# Patient Record
Sex: MALE | Race: WHITE | Employment: OTHER | URBAN - METROPOLITAN AREA
[De-identification: names, ages, dates, MRNs, and addresses within clinical notes are randomized per-mention and may not be internally consistent; named-entity substitution may affect disease eponyms.]

---

## 2017-01-05 ENCOUNTER — OFFICE VISIT (OUTPATIENT)
Dept: FAMILY MEDICINE CLINIC | Age: 57
End: 2017-01-05

## 2017-01-05 VITALS
OXYGEN SATURATION: 97 % | DIASTOLIC BLOOD PRESSURE: 76 MMHG | WEIGHT: 187 LBS | SYSTOLIC BLOOD PRESSURE: 128 MMHG | HEART RATE: 70 BPM | HEIGHT: 69 IN | BODY MASS INDEX: 27.7 KG/M2 | RESPIRATION RATE: 16 BRPM

## 2017-01-05 DIAGNOSIS — D22.9 CHANGE IN MOLE: ICD-10-CM

## 2017-01-05 DIAGNOSIS — I10 ESSENTIAL HYPERTENSION: Primary | ICD-10-CM

## 2017-01-05 DIAGNOSIS — N52.9 ERECTILE DYSFUNCTION, UNSPECIFIED ERECTILE DYSFUNCTION TYPE: ICD-10-CM

## 2017-01-05 DIAGNOSIS — I10 ESSENTIAL HYPERTENSION: ICD-10-CM

## 2017-01-05 DIAGNOSIS — F17.200 TOBACCO DEPENDENCY: ICD-10-CM

## 2017-01-05 DIAGNOSIS — J06.9 UPPER RESPIRATORY TRACT INFECTION, UNSPECIFIED TYPE: ICD-10-CM

## 2017-01-05 DIAGNOSIS — M72.2 PLANTAR FASCIITIS OF LEFT FOOT: ICD-10-CM

## 2017-01-05 LAB
A/G RATIO: 1.7 (ref 1.1–2.2)
ALBUMIN SERPL-MCNC: 4.2 G/DL (ref 3.4–5)
ALP BLD-CCNC: 98 U/L (ref 40–129)
ALT SERPL-CCNC: 18 U/L (ref 10–40)
ANION GAP SERPL CALCULATED.3IONS-SCNC: 15 MMOL/L (ref 3–16)
AST SERPL-CCNC: 20 U/L (ref 15–37)
BILIRUB SERPL-MCNC: <0.2 MG/DL (ref 0–1)
BUN BLDV-MCNC: 11 MG/DL (ref 7–20)
CALCIUM SERPL-MCNC: 9.2 MG/DL (ref 8.3–10.6)
CHLORIDE BLD-SCNC: 101 MMOL/L (ref 99–110)
CHOLESTEROL, TOTAL: 199 MG/DL (ref 0–199)
CO2: 24 MMOL/L (ref 21–32)
CREAT SERPL-MCNC: 0.9 MG/DL (ref 0.9–1.3)
GFR AFRICAN AMERICAN: >60
GFR NON-AFRICAN AMERICAN: >60
GLOBULIN: 2.5 G/DL
GLUCOSE BLD-MCNC: 96 MG/DL (ref 70–99)
HDLC SERPL-MCNC: 29 MG/DL (ref 40–60)
LDL CHOLESTEROL CALCULATED: 130 MG/DL
POTASSIUM SERPL-SCNC: 4.8 MMOL/L (ref 3.5–5.1)
SODIUM BLD-SCNC: 140 MMOL/L (ref 136–145)
TOTAL PROTEIN: 6.7 G/DL (ref 6.4–8.2)
TRIGL SERPL-MCNC: 199 MG/DL (ref 0–150)
VLDLC SERPL CALC-MCNC: 40 MG/DL

## 2017-01-05 PROCEDURE — 99214 OFFICE O/P EST MOD 30 MIN: CPT | Performed by: FAMILY MEDICINE

## 2017-01-05 RX ORDER — AZITHROMYCIN 250 MG/1
TABLET, FILM COATED ORAL
Qty: 1 PACKET | Refills: 0 | Status: SHIPPED | OUTPATIENT
Start: 2017-01-05 | End: 2017-01-15

## 2017-01-05 RX ORDER — SILDENAFIL 100 MG/1
100 TABLET, FILM COATED ORAL PRN
Qty: 15 TABLET | Refills: 3 | Status: SHIPPED | OUTPATIENT
Start: 2017-01-05 | End: 2018-04-05

## 2017-01-05 RX ORDER — METHYLPREDNISOLONE 4 MG/1
TABLET ORAL
Qty: 21 TABLET | Refills: 0 | Status: SHIPPED | OUTPATIENT
Start: 2017-01-05 | End: 2017-08-17 | Stop reason: ALTCHOICE

## 2017-01-05 ASSESSMENT — ENCOUNTER SYMPTOMS
SHORTNESS OF BREATH: 0
BLURRED VISION: 0

## 2017-01-09 ENCOUNTER — TELEPHONE (OUTPATIENT)
Dept: FAMILY MEDICINE CLINIC | Age: 57
End: 2017-01-09

## 2017-01-09 RX ORDER — AMLODIPINE BESYLATE 5 MG/1
5 TABLET ORAL DAILY
Qty: 30 TABLET | Refills: 3 | Status: SHIPPED | OUTPATIENT
Start: 2017-01-09 | End: 2018-03-11 | Stop reason: ALTCHOICE

## 2017-01-09 RX ORDER — ATORVASTATIN CALCIUM 10 MG/1
10 TABLET, FILM COATED ORAL DAILY
Qty: 30 TABLET | Refills: 3 | Status: ON HOLD | OUTPATIENT
Start: 2017-01-09 | End: 2018-03-13 | Stop reason: HOSPADM

## 2017-08-17 ENCOUNTER — OFFICE VISIT (OUTPATIENT)
Dept: ORTHOPEDIC SURGERY | Age: 57
End: 2017-08-17

## 2017-08-17 VITALS
HEIGHT: 70 IN | WEIGHT: 185 LBS | BODY MASS INDEX: 26.48 KG/M2 | DIASTOLIC BLOOD PRESSURE: 85 MMHG | SYSTOLIC BLOOD PRESSURE: 116 MMHG | HEART RATE: 72 BPM

## 2017-08-17 DIAGNOSIS — M65.311 TRIGGER FINGER OF RIGHT THUMB: ICD-10-CM

## 2017-08-17 PROCEDURE — 20550 NJX 1 TENDON SHEATH/LIGAMENT: CPT | Performed by: ORTHOPAEDIC SURGERY

## 2017-08-17 PROCEDURE — 99243 OFF/OP CNSLTJ NEW/EST LOW 30: CPT | Performed by: ORTHOPAEDIC SURGERY

## 2018-03-11 PROBLEM — R07.9 CHEST PAIN: Status: ACTIVE | Noted: 2018-03-11

## 2018-03-13 ENCOUNTER — TELEPHONE (OUTPATIENT)
Dept: CARDIOLOGY | Age: 58
End: 2018-03-13

## 2018-03-19 ENCOUNTER — TELEPHONE (OUTPATIENT)
Dept: CARDIOLOGY CLINIC | Age: 58
End: 2018-03-19

## 2018-03-19 NOTE — TELEPHONE ENCOUNTER
Pt's wife  called stating her  is feeling better. States numbness is gone, color has returned and wrist feels normal temp.

## 2018-03-26 ENCOUNTER — HOSPITAL ENCOUNTER (OUTPATIENT)
Dept: OTHER | Age: 58
Discharge: HOME | End: 2018-03-31
Attending: INTERNAL MEDICINE | Admitting: INTERNAL MEDICINE

## 2018-03-29 ENCOUNTER — OFFICE VISIT (OUTPATIENT)
Dept: CARDIOLOGY CLINIC | Age: 58
End: 2018-03-29

## 2018-03-29 ENCOUNTER — HOSPITAL ENCOUNTER (OUTPATIENT)
Dept: OTHER | Age: 58
Discharge: HOME | End: 2018-03-31
Attending: NURSE PRACTITIONER | Admitting: NURSE PRACTITIONER

## 2018-03-29 VITALS
OXYGEN SATURATION: 96 % | HEART RATE: 60 BPM | HEIGHT: 70 IN | SYSTOLIC BLOOD PRESSURE: 114 MMHG | DIASTOLIC BLOOD PRESSURE: 76 MMHG | BODY MASS INDEX: 28.06 KG/M2 | WEIGHT: 196 LBS

## 2018-03-29 DIAGNOSIS — I10 ESSENTIAL HYPERTENSION: ICD-10-CM

## 2018-03-29 DIAGNOSIS — I25.10 CORONARY ARTERY DISEASE INVOLVING NATIVE CORONARY ARTERY OF NATIVE HEART WITHOUT ANGINA PECTORIS: ICD-10-CM

## 2018-03-29 DIAGNOSIS — I25.5 ISCHEMIC CARDIOMYOPATHY: Primary | ICD-10-CM

## 2018-03-29 LAB
ANION GAP SERPL CALCULATED.3IONS-SCNC: 13 MMOL/L (ref 3–16)
BUN BLDV-MCNC: 14 MG/DL (ref 7–20)
CALCIUM SERPL-MCNC: 9.7 MG/DL (ref 8.3–10.6)
CHLORIDE BLD-SCNC: 98 MMOL/L (ref 99–110)
CO2: 27 MMOL/L (ref 21–32)
CREAT SERPL-MCNC: 0.8 MG/DL (ref 0.9–1.3)
GFR AFRICAN AMERICAN: >60
GFR NON-AFRICAN AMERICAN: >60
GLUCOSE BLD-MCNC: 84 MG/DL (ref 70–99)
POTASSIUM SERPL-SCNC: 5.2 MMOL/L (ref 3.5–5.1)
SODIUM BLD-SCNC: 138 MMOL/L (ref 136–145)

## 2018-03-29 PROCEDURE — 99213 OFFICE O/P EST LOW 20 MIN: CPT | Performed by: NURSE PRACTITIONER

## 2018-03-29 RX ORDER — ASPIRIN 81 MG/1
81 TABLET, CHEWABLE ORAL DAILY
Qty: 90 TABLET | Refills: 2 | Status: SHIPPED | OUTPATIENT
Start: 2018-03-29 | End: 2019-06-25 | Stop reason: SDUPTHER

## 2018-03-29 RX ORDER — METOPROLOL SUCCINATE 25 MG/1
25 TABLET, EXTENDED RELEASE ORAL DAILY
Qty: 90 TABLET | Refills: 2 | Status: SHIPPED | OUTPATIENT
Start: 2018-03-29 | End: 2018-06-27 | Stop reason: SDUPTHER

## 2018-03-29 RX ORDER — ATORVASTATIN CALCIUM 40 MG/1
40 TABLET, FILM COATED ORAL NIGHTLY
Qty: 90 TABLET | Refills: 2 | Status: SHIPPED | OUTPATIENT
Start: 2018-03-29 | End: 2018-12-21 | Stop reason: SDUPTHER

## 2018-03-29 NOTE — LETTER
[facility] Cardiology - [address] [facility] [place] [address]  Phone: [phone]  Fax: [phone]    Jm Macario CNP        April 2, 2018     Abhi Olvera [address] [facility]    Patient: Mary Harris  MR Number: [id number]  YOB: 1960  Date of Visit: 3/29/2018    Dear Dr. Radhika Garcia: Thank you for the request for consultation for Zoe Leigh to me for the evaluation. Below are the relevant portions of my assessment and plan of care. [address]   Cardiac Follow-up    Primary Care Doctor:  Radhika Garcia MD    Chief Complaint   Patient presents with   [address] [place] [facility] [place]3/13/2018, wero [place] Held 3/12/2018    Hypertension    Hyperlipidemia    Discuss Labs     lipid 3/12/2018, troponin 3/13/2018, cmp 3/11/2018    Results     ekg 3/14/2018        History of Present Illness:   I had the pleasure of seeing Mary Harris in follow up for hospital follow up. Recently admitted from 3/11/18-3/13/18 with chest pain, underwent LHC on 3/12/18 PCI and POBA of the mid LAD. Since discharge, he is feeling good, breathing is so much better. No complaints. He has improved his diet completely, no red meat, more fruits and vegtables. No bleeding. Snoring has imrpoved. Right wrist symptoms improved, no more numbness or tingling or temperature changes to the right hand. He is eating better. Sleeping better. Mary Harris  denies chest pain, dyspnea, palpitations, orthopnea, PND, fatigue, early saiety, edema, syncope. NYHA:   I  ACC/ AHA Stage:    B    Past Medical History:   has a past medical history of CAD (coronary artery disease); Hyperlipidemia; and Hypertension. Surgical History:   has a past surgical history that includes Colonoscopy and Percutaneous Transluminal Coronary Angio (03/12/2018). Social History:   reports that he has quit smoking. He smoked 1.00 pack per day.  He has · Endocrine:  No excessive thirst or urination. · Hematologic/Lymphatic: No abnormal bruising or bleeding, blood clots or swollen lymph nodes.     Physical Examination:    Vitals:    03/29/18 1303   BP: 114/76   Pulse: 60   SpO2: 96%   Weight: 196 lb (88.9 kg)   Height: 5' 10\" (1.778 m)        Constitutional and General Appearance: no apparent distress  HEENT: non-icteric sclera, oropharynx without exudate, oral mucosa moist  Neck: JVP less than 8 cm H20  Respiratory:  · No use of accessory muscles  · Clear breath sounds throughout, no wheezing, no crackles, no rhonchi  Cardiovascular:  · The apical impulses not displaced  · Heart tones are crisp and normal, no murmur/rub/gallop  · Regular rate and rhythm, S1,S2 normal  · Radial pulses 2+ and equal bilaterally  · No edema, right wrist no hematoma or bruising, 5/5 strength bilateral hand , warm  · Pedal Pulses: 2+ and equal   Abdomen:  · No masses or tenderness  · Liver: No Abnormalities Noted  Musculoskeletal/Skin:  · Exhibits normal gait balance and coordination  · There is no clubbing, cyanosis of the extremities  · Skin is warm and dry  · Moves all extremities well  Neurological/Psychiatric:  · Alert and oriented in all spheres  · No abnormalities of mood, affect, memory, mentation, or behavior are noted    Lab Data:  CBC:   Lab Results   Component Value Date    WBC 9.1 03/13/2018    WBC 8.0 03/11/2018    WBC 10.1 07/07/2016    RBC 4.77 03/13/2018    RBC 5.21 03/11/2018    RBC 5.03 07/07/2016    HGB 14.1 03/13/2018    HGB 15.5 03/11/2018    HGB 15.2 07/07/2016    HCT 41.9 03/13/2018    HCT 45.8 03/11/2018    HCT 45.8 07/07/2016    MCV 87.9 03/13/2018    MCV 88.0 03/11/2018    MCV 91.0 07/07/2016    RDW 13.5 03/13/2018    RDW 13.3 03/11/2018    RDW 14.2 07/07/2016     03/13/2018     03/11/2018     07/07/2016     BMP:   Lab Results   Component Value Date     03/11/2018     01/05/2017     07/07/2016    K 4.5 03/11/2018 lisinopril restart  2. No changes to medications  3. Follow up 3 months with Dr. Tania Styles  1. Tobacco Cessation Counseling: NA  2. Retake of BP if >140/90:   NA  3. Documentation to PCP/referring for new patient:  Sent to PCP at close of office visit  4. CAD patient on anti-platelet: Yes  5. CAD patient on STATIN therapy:  Yes  6. Patient with CHF and aFib on anticoagulation:  NA     I appreciate the opportunity for caring for this patient. Richmond Carrington CNP, 3/29/2018, 1:18 PM         If you have questions, please do not hesitate to call me. I look forward to following Roberto Vyas along with you.     Sincerely,        Richmond Carrington CNP

## 2018-03-29 NOTE — PATIENT INSTRUCTIONS
Plan:   1. Check labs -standing order for BMP  2. No changes to medications  3. Continue cardiac rehab  4.  Follow up 3 months with Dr. King How

## 2018-03-29 NOTE — PROGRESS NOTES
rhonchi  Cardiovascular:  · The apical impulses not displaced  · Heart tones are crisp and normal, no murmur/rub/gallop  · Regular rate and rhythm, S1,S2 normal  · Radial pulses 2+ and equal bilaterally  · No edema, right wrist no hematoma or bruising, 5/5 strength bilateral hand , warm  · Pedal Pulses: 2+ and equal   Abdomen:  · No masses or tenderness  · Liver: No Abnormalities Noted  Musculoskeletal/Skin:  · Exhibits normal gait balance and coordination  · There is no clubbing, cyanosis of the extremities  · Skin is warm and dry  · Moves all extremities well  Neurological/Psychiatric:  · Alert and oriented in all spheres  · No abnormalities of mood, affect, memory, mentation, or behavior are noted    Lab Data:  CBC:   Lab Results   Component Value Date    WBC 9.1 03/13/2018    WBC 8.0 03/11/2018    WBC 10.1 07/07/2016    RBC 4.77 03/13/2018    RBC 5.21 03/11/2018    RBC 5.03 07/07/2016    HGB 14.1 03/13/2018    HGB 15.5 03/11/2018    HGB 15.2 07/07/2016    HCT 41.9 03/13/2018    HCT 45.8 03/11/2018    HCT 45.8 07/07/2016    MCV 87.9 03/13/2018    MCV 88.0 03/11/2018    MCV 91.0 07/07/2016    RDW 13.5 03/13/2018    RDW 13.3 03/11/2018    RDW 14.2 07/07/2016     03/13/2018     03/11/2018     07/07/2016     BMP:   Lab Results   Component Value Date     03/11/2018     01/05/2017     07/07/2016    K 4.5 03/11/2018    K 4.8 01/05/2017    K 4.7 07/07/2016     03/11/2018     01/05/2017     07/07/2016    CO2 23 03/11/2018    CO2 24 01/05/2017    CO2 20 07/07/2016    BUN 12 03/11/2018    BUN 11 01/05/2017    BUN 19 07/07/2016    CREATININE 0.8 03/11/2018    CREATININE 0.9 01/05/2017    CREATININE 0.8 07/07/2016     BNP: No results found for: PROBNP    Recent Testing:  Echo 3/13/18  Summary   Left ventricular systolic function is mildly reduced with an ejection   fraction of 45-50 %. Hypokinetic apex and anteroapical wall.    Mild concentric left ventricular

## 2018-03-30 DIAGNOSIS — I25.5 ISCHEMIC CARDIOMYOPATHY: Primary | ICD-10-CM

## 2018-04-01 ENCOUNTER — HOSPITAL ENCOUNTER (OUTPATIENT)
Dept: OTHER | Age: 58
Discharge: HOME | End: 2018-04-30
Attending: NURSE PRACTITIONER | Admitting: NURSE PRACTITIONER

## 2018-04-01 ENCOUNTER — HOSPITAL ENCOUNTER (OUTPATIENT)
Dept: OTHER | Age: 58
Discharge: HOME | End: 2018-04-30
Attending: INTERNAL MEDICINE | Admitting: INTERNAL MEDICINE

## 2018-04-02 NOTE — COMMUNICATION BODY
[address]   Cardiac Follow-up    Primary Care Doctor:  Phu Villeda MD    Chief Complaint   Patient presents with   [address] [place] [facility] [place]     3/11-3/13/2018, wero [place]in 3/12/2018    Hypertension    Hyperlipidemia    Discuss Labs     lipid 3/12/2018, troponin 3/13/2018, cmp 3/11/2018    Results     ekg 3/14/2018        History of Present Illness:   I had the pleasure of seeing Anaid Murrell in follow up for hospital follow up. Recently admitted from 3/11/18-3/13/18 with chest pain, underwent LHC on 3/12/18 PCI and POBA of the mid LAD. Since discharge, he is feeling good, breathing is so much better. No complaints. He has improved his diet completely, no red meat, more fruits and vegtables. No bleeding. Snoring has imrpoved. Right wrist symptoms improved, no more numbness or tingling or temperature changes to the right hand. He is eating better. Sleeping better. Anaid Murrell  denies chest pain, dyspnea, palpitations, orthopnea, PND, fatigue, early saiety, edema, syncope. NYHA:   I  ACC/ AHA Stage:    B    Past Medical History:   has a past medical history of CAD (coronary artery disease); Hyperlipidemia; and Hypertension. Surgical History:   has a past surgical history that includes Colonoscopy and Percutaneous Transluminal Coronary Angio (03/12/2018). Social History:   reports that he has quit smoking. He smoked 1.00 pack per day. He has never used smokeless tobacco. He reports that he uses drugs, including Marijuana. He reports that he does not drink alcohol. Family History:   History reviewed. No pertinent family history. Home Medications:  Prior to Admission medications    Medication Sig Start Date End Date Taking?  Authorizing Provider   aspirin 81 MG chewable tablet Take 1 tablet by mouth daily 3/14/18  Yes Zain Lees CNP   atorvastatin (LIPITOR) 40 MG tablet Take 1 tablet by mouth nightly 3/13/18  Yes Zain Lees CNP   metoprolol succinate (TOPROL

## 2018-04-04 ENCOUNTER — TELEPHONE (OUTPATIENT)
Dept: CARDIOLOGY CLINIC | Age: 58
End: 2018-04-04

## 2018-04-05 ENCOUNTER — OFFICE VISIT (OUTPATIENT)
Dept: ORTHOPEDIC SURGERY | Age: 58
End: 2018-04-05

## 2018-04-05 VITALS — WEIGHT: 195.99 LBS | HEIGHT: 70 IN | BODY MASS INDEX: 28.06 KG/M2

## 2018-04-05 DIAGNOSIS — M79.645 FINGER PAIN, LEFT: ICD-10-CM

## 2018-04-05 DIAGNOSIS — M79.644 FINGER PAIN, RIGHT: Primary | ICD-10-CM

## 2018-04-05 DIAGNOSIS — M65.312 TRIGGER FINGER OF LEFT THUMB: ICD-10-CM

## 2018-04-05 PROCEDURE — 20550 NJX 1 TENDON SHEATH/LIGAMENT: CPT | Performed by: ORTHOPAEDIC SURGERY

## 2018-04-05 PROCEDURE — 99213 OFFICE O/P EST LOW 20 MIN: CPT | Performed by: ORTHOPAEDIC SURGERY

## 2018-04-11 PROBLEM — R07.89 OTHER CHEST PAIN: Status: ACTIVE | Noted: 2018-03-11

## 2018-04-12 ENCOUNTER — TELEPHONE (OUTPATIENT)
Dept: CARDIOLOGY CLINIC | Age: 58
End: 2018-04-12

## 2018-05-01 ENCOUNTER — HOSPITAL ENCOUNTER (OUTPATIENT)
Dept: OTHER | Age: 58
Discharge: HOME | End: 2018-05-31
Attending: INTERNAL MEDICINE | Admitting: INTERNAL MEDICINE

## 2018-06-01 ENCOUNTER — HOSPITAL ENCOUNTER (OUTPATIENT)
Dept: OTHER | Age: 58
Discharge: HOME | End: 2018-06-01
Attending: INTERNAL MEDICINE | Admitting: INTERNAL MEDICINE

## 2018-06-27 ENCOUNTER — HOSPITAL ENCOUNTER (OUTPATIENT)
Dept: GENERAL RADIOLOGY | Age: 58
Discharge: HOME | End: 2018-06-27
Attending: INTERNAL MEDICINE | Admitting: INTERNAL MEDICINE

## 2018-06-27 ENCOUNTER — OFFICE VISIT (OUTPATIENT)
Dept: CARDIOLOGY CLINIC | Age: 58
End: 2018-06-27

## 2018-06-27 VITALS
DIASTOLIC BLOOD PRESSURE: 66 MMHG | HEIGHT: 70 IN | BODY MASS INDEX: 28.92 KG/M2 | HEART RATE: 55 BPM | SYSTOLIC BLOOD PRESSURE: 118 MMHG | WEIGHT: 202 LBS | OXYGEN SATURATION: 98 %

## 2018-06-27 DIAGNOSIS — E87.5 HYPERKALEMIA: ICD-10-CM

## 2018-06-27 DIAGNOSIS — I21.4 NSTEMI (NON-ST ELEVATED MYOCARDIAL INFARCTION) (HCC): ICD-10-CM

## 2018-06-27 DIAGNOSIS — I25.5 ISCHEMIC CARDIOMYOPATHY: ICD-10-CM

## 2018-06-27 DIAGNOSIS — I25.10 CORONARY ARTERY DISEASE INVOLVING NATIVE CORONARY ARTERY OF NATIVE HEART WITHOUT ANGINA PECTORIS: Primary | ICD-10-CM

## 2018-06-27 DIAGNOSIS — I10 ESSENTIAL HYPERTENSION: ICD-10-CM

## 2018-06-27 DIAGNOSIS — E78.5 HYPERLIPIDEMIA, UNSPECIFIED HYPERLIPIDEMIA TYPE: ICD-10-CM

## 2018-06-27 LAB
ANION GAP SERPL CALCULATED.3IONS-SCNC: 11 MMOL/L (ref 3–16)
BUN BLDV-MCNC: 10 MG/DL (ref 7–20)
CALCIUM SERPL-MCNC: 9.6 MG/DL (ref 8.3–10.6)
CHLORIDE BLD-SCNC: 108 MMOL/L (ref 99–110)
CO2: 25 MMOL/L (ref 21–32)
CREAT SERPL-MCNC: 0.9 MG/DL (ref 0.9–1.3)
GFR AFRICAN AMERICAN: >60
GFR NON-AFRICAN AMERICAN: >60
GLUCOSE BLD-MCNC: 90 MG/DL (ref 70–99)
POTASSIUM SERPL-SCNC: 5.5 MMOL/L (ref 3.5–5.1)
SODIUM BLD-SCNC: 144 MMOL/L (ref 136–145)

## 2018-06-27 PROCEDURE — 99214 OFFICE O/P EST MOD 30 MIN: CPT | Performed by: INTERNAL MEDICINE

## 2018-06-27 RX ORDER — METOPROLOL SUCCINATE 25 MG/1
25 TABLET, EXTENDED RELEASE ORAL 2 TIMES DAILY
Qty: 90 TABLET | Refills: 2
Start: 2018-06-27 | End: 2019-06-25 | Stop reason: SDUPTHER

## 2018-06-27 RX ORDER — LOSARTAN POTASSIUM 50 MG/1
50 TABLET ORAL DAILY
Qty: 30 TABLET | Refills: 3
Start: 2018-06-27 | End: 2019-06-25 | Stop reason: SDUPTHER

## 2018-06-29 ENCOUNTER — TELEPHONE (OUTPATIENT)
Dept: CARDIOLOGY CLINIC | Age: 58
End: 2018-06-29

## 2018-07-24 ENCOUNTER — APPOINTMENT (OUTPATIENT)
Dept: GENERAL RADIOLOGY | Age: 58
End: 2018-07-24
Payer: COMMERCIAL

## 2018-07-24 ENCOUNTER — HOSPITAL ENCOUNTER (EMERGENCY)
Age: 58
Discharge: HOME | End: 2018-07-24
Attending: EMERGENCY MEDICINE
Payer: COMMERCIAL

## 2018-07-24 VITALS
SYSTOLIC BLOOD PRESSURE: 141 MMHG | BODY MASS INDEX: 28.12 KG/M2 | HEART RATE: 65 BPM | TEMPERATURE: 98.4 F | OXYGEN SATURATION: 99 % | WEIGHT: 196 LBS | DIASTOLIC BLOOD PRESSURE: 75 MMHG | RESPIRATION RATE: 13 BRPM

## 2018-07-24 DIAGNOSIS — R07.89 CHEST TIGHTNESS: ICD-10-CM

## 2018-07-24 DIAGNOSIS — I10 ESSENTIAL HYPERTENSION: Primary | ICD-10-CM

## 2018-07-24 LAB
A/G RATIO: 1.6 (ref 1.1–2.2)
ALBUMIN SERPL-MCNC: 4.4 G/DL (ref 3.4–5)
ALP BLD-CCNC: 83 U/L (ref 40–129)
ALT SERPL-CCNC: 32 U/L (ref 10–40)
ANION GAP SERPL CALCULATED.3IONS-SCNC: 13 MMOL/L (ref 3–16)
APTT: 29.6 SEC (ref 26–36)
AST SERPL-CCNC: 24 U/L (ref 15–37)
BASOPHILS ABSOLUTE: 0 K/UL (ref 0–0.2)
BASOPHILS RELATIVE PERCENT: 0.4 %
BILIRUB SERPL-MCNC: 0.4 MG/DL (ref 0–1)
BUN BLDV-MCNC: 13 MG/DL (ref 7–20)
CALCIUM SERPL-MCNC: 9.5 MG/DL (ref 8.3–10.6)
CHLORIDE BLD-SCNC: 101 MMOL/L (ref 99–110)
CO2: 23 MMOL/L (ref 21–32)
CREAT SERPL-MCNC: 1 MG/DL (ref 0.9–1.3)
EOSINOPHILS ABSOLUTE: 0.1 K/UL (ref 0–0.6)
EOSINOPHILS RELATIVE PERCENT: 1.2 %
GFR AFRICAN AMERICAN: >60
GFR NON-AFRICAN AMERICAN: >60
GLOBULIN: 2.7 G/DL
GLUCOSE BLD-MCNC: 119 MG/DL (ref 70–99)
HCT VFR BLD CALC: 41.9 % (ref 40.5–52.5)
HEMOGLOBIN: 14.5 G/DL (ref 13.5–17.5)
INR BLD: 1.04 (ref 0.86–1.14)
LYMPHOCYTES ABSOLUTE: 2.3 K/UL (ref 1–5.1)
LYMPHOCYTES RELATIVE PERCENT: 20.4 %
MCH RBC QN AUTO: 30.5 PG (ref 26–34)
MCHC RBC AUTO-ENTMCNC: 34.6 G/DL (ref 31–36)
MCV RBC AUTO: 88.2 FL (ref 80–100)
MONOCYTES ABSOLUTE: 0.6 K/UL (ref 0–1.3)
MONOCYTES RELATIVE PERCENT: 5.1 %
NEUTROPHILS ABSOLUTE: 8.4 K/UL (ref 1.7–7.7)
NEUTROPHILS RELATIVE PERCENT: 72.9 %
PDW BLD-RTO: 13.3 % (ref 12.4–15.4)
PLATELET # BLD: 179 K/UL (ref 135–450)
PMV BLD AUTO: 8.7 FL (ref 5–10.5)
POTASSIUM SERPL-SCNC: 4.2 MMOL/L (ref 3.5–5.1)
PROTHROMBIN TIME: 11.9 SEC (ref 9.8–13)
RBC # BLD: 4.75 M/UL (ref 4.2–5.9)
SODIUM BLD-SCNC: 137 MMOL/L (ref 136–145)
TOTAL PROTEIN: 7.1 G/DL (ref 6.4–8.2)
TROPONIN: <0.01 NG/ML
TROPONIN: <0.01 NG/ML
WBC # BLD: 11.5 K/UL (ref 4–11)

## 2018-07-24 PROCEDURE — 85610 PROTHROMBIN TIME: CPT

## 2018-07-24 PROCEDURE — 93005 ELECTROCARDIOGRAM TRACING: CPT | Performed by: EMERGENCY MEDICINE

## 2018-07-24 PROCEDURE — 84484 ASSAY OF TROPONIN QUANT: CPT

## 2018-07-24 PROCEDURE — 85730 THROMBOPLASTIN TIME PARTIAL: CPT

## 2018-07-24 PROCEDURE — 71046 X-RAY EXAM CHEST 2 VIEWS: CPT

## 2018-07-24 PROCEDURE — 99285 EMERGENCY DEPT VISIT HI MDM: CPT

## 2018-07-24 PROCEDURE — 85025 COMPLETE CBC W/AUTO DIFF WBC: CPT

## 2018-07-24 PROCEDURE — 80053 COMPREHEN METABOLIC PANEL: CPT

## 2018-07-24 ASSESSMENT — HEART SCORE: ECG: 0

## 2018-07-25 LAB
EKG ATRIAL RATE: 68 BPM
EKG DIAGNOSIS: NORMAL
EKG P AXIS: 14 DEGREES
EKG P-R INTERVAL: 152 MS
EKG Q-T INTERVAL: 384 MS
EKG QRS DURATION: 84 MS
EKG QTC CALCULATION (BAZETT): 140 MS
EKG R AXIS: 37 DEGREES
EKG T AXIS: 28 DEGREES
EKG VENTRICULAR RATE: 8 BPM

## 2018-07-25 PROCEDURE — 93010 ELECTROCARDIOGRAM REPORT: CPT | Performed by: INTERNAL MEDICINE

## 2018-07-25 NOTE — ED PROVIDER NOTES
Emergency Department [facility]  ED    Patient: Miguelina Acosta  MRN: [id number]  : 1960  Date of Evaluation: 2018  ED Supervising Physician: Flores Hemphill DO    I independently examined and evaluated Miguelina Acosta. In brief, Miguelina Acosta is a 62 y.o. male that presents to the emergency department with a chief complaint of chest pain patient is a 54-year-old male history of having an MI E presents today with a two-day history of anxiety attacks states that the difference in the yard work and follow blood pressure was elevated nothing out of the ordinary. No diabetes no hyperlipidemia occasional alcohol does not smoke history of hypertension no other aggravating associated or alleviating signs or symptoms    Focused exam:  no acute distress nontoxic appearing  pupils equally round react to light extraocular ocular motors are intact  Heart regular Rate and rhythm  lungs are clear to auscultation anterior posterior fields  Abdomen soft no firm or pulsatile masses  Neurovascular they moves all 4 extremities without any difficulties or gross abnormalities  Skin Without any rashes or lesions  Affect is appropriate  10 systems reviewed and otherwise negative    Brief ED course/MDM: Troponin 2 sets are normal CBC is normal CMP is normal INR 1.04 chest x-ray is negative. EKG shows normal sinus rhythm no acute ST elevation is noted. Patient discharged in stable condition struck from his primary care physician return any changes or concerns  I estimate there is LOW risk for ACUTE CORONARY SYNDROME, INTRACRANIAL HEMORRHAGE, MALIGNANT DYSRHYTHMIA or HYPERTENSION, PULMONARY EMBOLISM, SEPSIS, SUBARACHNOID HEMORRHAGE, SUBDURAL HEMATOMA, STROKE, or THORACIC AORTIC DISSECTION, thus I consider the discharge disposition reasonable.   The patient , family members present and I have discussed the diagnosis and risks, and we agree with discharging home to follow-up with their primary

## 2018-08-02 ENCOUNTER — HOSPITAL ENCOUNTER (OUTPATIENT)
Dept: CARDIOLOGY | Age: 58
Discharge: HOME | End: 2018-08-02
Payer: COMMERCIAL

## 2018-08-02 DIAGNOSIS — I21.4 NSTEMI (NON-ST ELEVATED MYOCARDIAL INFARCTION) (HCC): ICD-10-CM

## 2018-08-02 DIAGNOSIS — I25.5 ISCHEMIC CARDIOMYOPATHY: ICD-10-CM

## 2018-08-02 DIAGNOSIS — I25.10 CORONARY ARTERY DISEASE INVOLVING NATIVE CORONARY ARTERY OF NATIVE HEART WITHOUT ANGINA PECTORIS: ICD-10-CM

## 2018-08-02 PROCEDURE — 93308 TTE F-UP OR LMTD: CPT

## 2018-08-03 ENCOUNTER — TELEPHONE (OUTPATIENT)
Dept: CARDIOLOGY CLINIC | Age: 58
End: 2018-08-03

## 2018-09-11 ENCOUNTER — OFFICE VISIT (OUTPATIENT)
Dept: ORTHOPEDIC SURGERY | Age: 58
End: 2018-09-11

## 2018-09-11 DIAGNOSIS — M65.312 TRIGGER FINGER OF LEFT THUMB: Primary | ICD-10-CM

## 2018-09-11 PROCEDURE — 99213 OFFICE O/P EST LOW 20 MIN: CPT | Performed by: ORTHOPAEDIC SURGERY

## 2018-09-11 PROCEDURE — 20550 NJX 1 TENDON SHEATH/LIGAMENT: CPT | Performed by: ORTHOPAEDIC SURGERY

## 2018-09-11 NOTE — PROGRESS NOTES
HISTORY OF PRESENT ILLNESS:  The patient returns reports that his left trigger thumb has started to return once again. He did have relief in the past with a left thumb trigger injection back in April. PAST MEDICAL HISTORY: Patient's medications, allergies, past medical, surgical, social and family histories were reviewed and updated as appropriate. ROS: Pertinent items are noted in HPI. Review of systems reviewed from patient history form dated on 4/5/2018 and available in the patient's chart under the media tab. Denies fever, chills, confusion, bowel/bladder active change. Past Medical History:   Diagnosis Date    CAD (coronary artery disease) 03/12/2018    PTCA    Hyperlipidemia     Hypertension     MI (myocardial infarction) ([facility] [address]          PHYSICAL EXAMINATION: Examination reveals a pleasant individual in no acute distress. There appears to be satisfactory pain-free range of motion, strength, and stability of the cervical spine, shoulders, and elbows. Skin is intact without lymphadenopathy, discoloration, or abnormal temperature. There is intact, symmetric circulation in both upper extremities. Wrist, hand, and digital range of motion is satisfactory bilaterally in the unaffected digits. Tenderness exists at the A1 pulley of the left thumb with grade 2 triggering. DIAGNOSTIC TESTING:        IMPRESSION AND PLAN:  Painful return of left thumb trigger digit. I talked with him about surgical and nonsurgical options. He is interested in a final trial with a cortisone injection before we would move on to surgery. Under sterile conditions, I injected the left thumb  at the A1 pulley and flexor tendon sheath with 1% lidocaine and 1 mL of Celestone. Appropriate injection risks and instructions were discussed.     He agrees that we will go through the paperwork so that we can move forward with definitive left trigger thumb release surgery if he fails to have lasting relief after this final

## 2018-09-11 NOTE — PROGRESS NOTES
INJECTION ADMINISTRATION DETAILS:    Date & Time:  9/11/18 9:39 AM  Site & Comments: L THUMB  Administered by Dr Bonilla Every    Betamethasone (30mg/mL)  #of CC:1  NDC #: 9407-6071-51  Lot #: [id number]  EXP: 02/2020    1% Lidocaine ( 10mg/mL)  # of CC : 1  [address] #: 0111-4020-62  LOT# :[id number]  EXP :09/2021

## 2018-12-21 RX ORDER — ATORVASTATIN CALCIUM 40 MG/1
40 TABLET, FILM COATED ORAL NIGHTLY
Qty: 90 TABLET | Refills: 3 | Status: SHIPPED | OUTPATIENT
Start: 2018-12-21 | End: 2021-02-19 | Stop reason: SDUPTHER

## 2019-01-11 RX ORDER — TICAGRELOR 90 MG/1
TABLET ORAL
Qty: 180 TABLET | Refills: 1 | Status: SHIPPED | OUTPATIENT
Start: 2019-01-11 | End: 2019-01-17 | Stop reason: SDUPTHER

## 2019-01-17 ENCOUNTER — OFFICE VISIT (OUTPATIENT)
Dept: CARDIOLOGY CLINIC | Age: 59
End: 2019-01-17
Payer: COMMERCIAL

## 2019-01-17 VITALS
BODY MASS INDEX: 29.35 KG/M2 | OXYGEN SATURATION: 98 % | HEART RATE: 73 BPM | SYSTOLIC BLOOD PRESSURE: 124 MMHG | WEIGHT: 205 LBS | HEIGHT: 70 IN | DIASTOLIC BLOOD PRESSURE: 80 MMHG

## 2019-01-17 DIAGNOSIS — E78.5 HYPERLIPIDEMIA, UNSPECIFIED HYPERLIPIDEMIA TYPE: ICD-10-CM

## 2019-01-17 DIAGNOSIS — I10 ESSENTIAL HYPERTENSION: ICD-10-CM

## 2019-01-17 DIAGNOSIS — I25.10 CORONARY ARTERY DISEASE INVOLVING NATIVE CORONARY ARTERY OF NATIVE HEART WITHOUT ANGINA PECTORIS: Primary | ICD-10-CM

## 2019-01-17 DIAGNOSIS — I25.5 ISCHEMIC CARDIOMYOPATHY: ICD-10-CM

## 2019-01-17 DIAGNOSIS — Z72.0 TOBACCO ABUSE: ICD-10-CM

## 2019-01-17 DIAGNOSIS — I21.4 NSTEMI (NON-ST ELEVATED MYOCARDIAL INFARCTION) (HCC): ICD-10-CM

## 2019-01-17 PROCEDURE — 99213 OFFICE O/P EST LOW 20 MIN: CPT | Performed by: INTERNAL MEDICINE

## 2019-05-23 ENCOUNTER — APPOINTMENT (OUTPATIENT)
Dept: GENERAL RADIOLOGY | Age: 59
End: 2019-05-23
Payer: COMMERCIAL

## 2019-05-23 ENCOUNTER — HOSPITAL ENCOUNTER (EMERGENCY)
Age: 59
Discharge: HOME | End: 2019-05-24
Attending: EMERGENCY MEDICINE
Payer: COMMERCIAL

## 2019-05-23 DIAGNOSIS — R07.9 CHEST PAIN, UNSPECIFIED TYPE: Primary | ICD-10-CM

## 2019-05-23 DIAGNOSIS — R03.0 ELEVATED BLOOD PRESSURE READING: ICD-10-CM

## 2019-05-23 DIAGNOSIS — R53.83 FATIGUE, UNSPECIFIED TYPE: ICD-10-CM

## 2019-05-23 DIAGNOSIS — R10.13 DYSPEPSIA: ICD-10-CM

## 2019-05-23 DIAGNOSIS — F41.1 ANXIETY STATE: ICD-10-CM

## 2019-05-23 LAB
A/G RATIO: 1.5 (ref 1.1–2.2)
ALBUMIN SERPL-MCNC: 4.3 G/DL (ref 3.4–5)
ALP BLD-CCNC: 93 U/L (ref 40–129)
ALT SERPL-CCNC: 50 U/L (ref 10–40)
ANION GAP SERPL CALCULATED.3IONS-SCNC: 10 MMOL/L (ref 3–16)
AST SERPL-CCNC: 28 U/L (ref 15–37)
BASOPHILS ABSOLUTE: 0 K/UL (ref 0–0.2)
BASOPHILS RELATIVE PERCENT: 0.4 %
BILIRUB SERPL-MCNC: 0.4 MG/DL (ref 0–1)
BUN BLDV-MCNC: 15 MG/DL (ref 7–20)
CALCIUM SERPL-MCNC: 9.8 MG/DL (ref 8.3–10.6)
CHLORIDE BLD-SCNC: 102 MMOL/L (ref 99–110)
CO2: 24 MMOL/L (ref 21–32)
CREAT SERPL-MCNC: 0.8 MG/DL (ref 0.9–1.3)
D DIMER: <200 NG/ML DDU (ref 0–229)
EOSINOPHILS ABSOLUTE: 0.2 K/UL (ref 0–0.6)
EOSINOPHILS RELATIVE PERCENT: 2.7 %
GFR AFRICAN AMERICAN: >60
GFR NON-AFRICAN AMERICAN: >60
GLOBULIN: 2.8 G/DL
GLUCOSE BLD-MCNC: 200 MG/DL (ref 70–99)
HCT VFR BLD CALC: 40.4 % (ref 40.5–52.5)
HEMOGLOBIN: 13.9 G/DL (ref 13.5–17.5)
LYMPHOCYTES ABSOLUTE: 2.6 K/UL (ref 1–5.1)
LYMPHOCYTES RELATIVE PERCENT: 31.5 %
MCH RBC QN AUTO: 30.3 PG (ref 26–34)
MCHC RBC AUTO-ENTMCNC: 34.4 G/DL (ref 31–36)
MCV RBC AUTO: 88.2 FL (ref 80–100)
MONOCYTES ABSOLUTE: 0.5 K/UL (ref 0–1.3)
MONOCYTES RELATIVE PERCENT: 5.5 %
NEUTROPHILS ABSOLUTE: 4.9 K/UL (ref 1.7–7.7)
NEUTROPHILS RELATIVE PERCENT: 59.9 %
PDW BLD-RTO: 13.3 % (ref 12.4–15.4)
PLATELET # BLD: 178 K/UL (ref 135–450)
PMV BLD AUTO: 8.5 FL (ref 5–10.5)
POTASSIUM REFLEX MAGNESIUM: 3.7 MMOL/L (ref 3.5–5.1)
RBC # BLD: 4.58 M/UL (ref 4.2–5.9)
SODIUM BLD-SCNC: 136 MMOL/L (ref 136–145)
TOTAL PROTEIN: 7.1 G/DL (ref 6.4–8.2)
TROPONIN: <0.01 NG/ML
WBC # BLD: 8.3 K/UL (ref 4–11)

## 2019-05-23 PROCEDURE — 84484 ASSAY OF TROPONIN QUANT: CPT

## 2019-05-23 PROCEDURE — 93005 ELECTROCARDIOGRAM TRACING: CPT | Performed by: EMERGENCY MEDICINE

## 2019-05-23 PROCEDURE — 6370000000 HC RX 637 (ALT 250 FOR IP): Performed by: EMERGENCY MEDICINE

## 2019-05-23 PROCEDURE — 85025 COMPLETE CBC W/AUTO DIFF WBC: CPT

## 2019-05-23 PROCEDURE — 71046 X-RAY EXAM CHEST 2 VIEWS: CPT

## 2019-05-23 PROCEDURE — 80053 COMPREHEN METABOLIC PANEL: CPT

## 2019-05-23 PROCEDURE — 99285 EMERGENCY DEPT VISIT HI MDM: CPT

## 2019-05-23 PROCEDURE — 85379 FIBRIN DEGRADATION QUANT: CPT

## 2019-05-23 RX ORDER — ALPRAZOLAM 0.25 MG/1
0.25 TABLET ORAL ONCE
Status: COMPLETED | OUTPATIENT
Start: 2019-05-23 | End: 2019-05-23

## 2019-05-23 RX ADMIN — ALPRAZOLAM 0.25 MG: 0.25 TABLET ORAL at 22:27

## 2019-05-23 ASSESSMENT — PAIN DESCRIPTION - LOCATION: LOCATION: CHEST

## 2019-05-23 ASSESSMENT — PAIN DESCRIPTION - PAIN TYPE: TYPE: ACUTE PAIN

## 2019-05-23 ASSESSMENT — HEART SCORE: ECG: 0

## 2019-05-23 ASSESSMENT — PAIN SCALES - GENERAL: PAINLEVEL_OUTOF10: 6

## 2019-05-24 VITALS
WEIGHT: 205 LBS | BODY MASS INDEX: 29.41 KG/M2 | HEART RATE: 66 BPM | DIASTOLIC BLOOD PRESSURE: 87 MMHG | SYSTOLIC BLOOD PRESSURE: 119 MMHG | RESPIRATION RATE: 16 BRPM | OXYGEN SATURATION: 98 % | TEMPERATURE: 97.7 F

## 2019-05-24 LAB
EKG ATRIAL RATE: 84 BPM
EKG DIAGNOSIS: NORMAL
EKG P AXIS: 33 DEGREES
EKG P-R INTERVAL: 152 MS
EKG Q-T INTERVAL: 348 MS
EKG QRS DURATION: 84 MS
EKG QTC CALCULATION (BAZETT): 411 MS
EKG R AXIS: 49 DEGREES
EKG T AXIS: 17 DEGREES
EKG VENTRICULAR RATE: 84 BPM
TROPONIN: <0.01 NG/ML

## 2019-05-24 PROCEDURE — 84484 ASSAY OF TROPONIN QUANT: CPT

## 2019-05-24 PROCEDURE — 93010 ELECTROCARDIOGRAM REPORT: CPT | Performed by: INTERNAL MEDICINE

## 2019-05-24 RX ORDER — NITROGLYCERIN 0.4 MG/1
TABLET SUBLINGUAL
Qty: 25 TABLET | Refills: 3 | Status: SHIPPED | OUTPATIENT
Start: 2019-05-24 | End: 2021-02-19 | Stop reason: SDUPTHER

## 2019-05-24 RX ORDER — FAMOTIDINE 20 MG/1
20 TABLET, FILM COATED ORAL 2 TIMES DAILY
Qty: 28 TABLET | Refills: 0 | Status: SHIPPED | OUTPATIENT
Start: 2019-05-24 | End: 2021-02-19 | Stop reason: ALTCHOICE

## 2019-05-24 RX ORDER — HYDROXYZINE HYDROCHLORIDE 10 MG/1
10 TABLET, FILM COATED ORAL 3 TIMES DAILY PRN
Qty: 12 TABLET | Refills: 0 | Status: SHIPPED | OUTPATIENT
Start: 2019-05-24 | End: 2019-06-03

## 2019-05-24 NOTE — TELEPHONE ENCOUNTER
Pt was at the ED again last night for high bp which ended up being an anxiety attack. The pt was started on Atarax and it is not helping at all. The pt has a new patient appt with a PCP on 5-30-19. The pt and wife and requesting if there is anything Nilo Catherine can call into the pt's pharmacy prior to his PCP appt. If anything called in please use this pharmacy [facility] michael viveros ph: [phone]. Please advise and contact pt regardless.

## 2019-05-24 NOTE — ED PROVIDER NOTES
Emergency Physician Note    Chief Complaint  Chest Pain (states chest pain and ill feeling for about a month, not going away, took a nitro and 325 aspirin PTA) and Fatigue       History of Present Illness  Brittany Centeno is a 62 y.o. male who presents to the ED for fatigue and chest discomfort. Patient states on May 4 20 when tingling for several days. While he was there he did have a upper respiratory infection that included a cough, nasal congestion, or sore throat. He states most of those symptoms have resolved but ever since then he's been feeling more fatigued than usual.  And then approximately 2 days ago he started experiencing what he describes as a \"twinge\" in his left chest wall under his back muscle. It is intermittent. It is not painful and is not pressure-like. It does not radiate. He states that he came in today because he started to think about it and he believes his anxiety took over and he wanted to get everything checked out. Patient does have a history of CAD with stent placement. Denies fever, chills,   shortness of breath, cough, abdominal pain, nausea, vomiting, diarrhea, headache, sore throat, dysuria, back pain, rash. No palliative/provocative factors. Positives and pertinent negatives as per HPI. All other of the 10 systems were reviewed and are negative. I have reviewed the following from the nursing documentation:      Prior to Admission medications    Medication Sig Start Date End Date Taking?  Authorizing Provider   ticagrelor (BRILINTA) 90 MG TABS tablet Take 1 tablet by mouth 2 times daily 1/17/19   Any Summers MD   atorvastatin (LIPITOR) 40 MG tablet Take 1 tablet by mouth nightly 12/21/18   Aida Lorenzana MD   metoprolol succinate (TOPROL XL) 25 MG extended release tablet Take 1 tablet by mouth 2 times daily 6/27/18   nAy Summers MD   losartan (COZAAR) 50 MG tablet Take 1 tablet by mouth daily 6/27/18   Any Summers MD   nitroGLYCERIN (NITROSTAT) 0.4 MG SL tablet up to max of 3 total doses. If no relief after 1 dose, call 911. 4/11/18   Anne Marie Cunningham MD   aspirin 81 MG chewable tablet Take 1 tablet by mouth daily 3/29/18   KRITSEN Fajardo - CNP   Omega-3 Fatty Acids (FISH OIL) 1000 MG CAPS Take 3,000 mg by mouth 3 times daily    Historical Provider, MD       Allergies as of 05/23/2019    (No Known Allergies)       Past Medical History:   Diagnosis Date    CAD (coronary artery disease) 03/12/2018    PTCA    Hyperlipidemia     Hypertension     MI (myocardial infarction) [facility])         Surgical History:   Past Surgical History:   Procedure Laterality Date    COLONOSCOPY      CORONARY ANGIOPLASTY WITH STENT PLACEMENT      PTCA  03/12/2018    EYAD -mLAD -Jay 3.5 x 34 mm and Jay 3.5 x 8 mm to proximal stent edge for edge dissection. Family History:  History reviewed. No pertinent family history.     Social History     Socioeconomic History    Marital status:      Spouse name: Not on file    Number of children: Not on file    Years of education: Not on file    Highest education level: Not on file   Occupational History    Not on file   Social Needs    Financial resource strain: Not on file    Food insecurity:     Worry: Not on file     Inability: Not on file    Transportation needs:     Medical: Not on file     Non-medical: Not on file   Tobacco Use    Smoking status: Former Smoker     Packs/day: 1.00    Smokeless tobacco: Never Used   Substance and Sexual Activity    Alcohol use: No    Drug use: Yes     Types: Marijuana    Sexual activity: Not on file   Lifestyle    Physical activity:     Days per week: Not on file     Minutes per session: Not on file    Stress: Not on file   Relationships    Social connections:     Talks on phone: Not on file     Gets together: Not on file     Attends Hoahaoism service: Not on file     Active member of club or organization: Not on file     Attends meetings of clubs or organizations: sensory deficit. Normal speech. Strength 5/5 in bilateral upper and lower extremities. PSYCHIATRIC: Not anxious, normal mood and affect, thoughts are linear and organized, without delusions/hallucinations, responds appropriately to questions      LABS and DIAGNOSTIC RESULTS  EKG  The Ekg interpreted by me shows  normal sinus rhythm with a rate of 84  Axis is   Normal  QTc is  normal  Intervals and Durations are unremarkable. ST Segments: normal  Delta waves, Brugada Syndrome, and Short FL are not present. Prior EKG to compare with was available. No significant changes compared to prior EKG from July 24, 2018      Cardiac Monitor Strip Interpretation    Interpreted by me  Monitor strip interpreted for greater than 10 seconds    Rhythm: normal sinus   Rate: normal  Ectopy: none  ST Segments: normal    RADIOLOGY  X-RAYS:  I have reviewed radiologic plain film image(s). ALL OTHER NON-PLAIN FILM IMAGES SUCH AS CT, ULTRASOUND AND MRI HAVE BEEN READ BY THE RADIOLOGIST. XR CHEST STANDARD (2 VW)   Final Result   No acute disease.               Chest X-Ray    Interpreted by: Emergency Department Physician    View: PA/Lateral chest xray    Findings: Normal heart size, Normal lungs, Normal mediastinum, No infiltrates, No hemothorax, No pneumothorax, No congestive heart failure, No effusion    LABS  Results for orders placed or performed during the hospital encounter of 05/23/19   CBC Auto Differential   Result Value Ref Range    WBC 8.3 4.0 - 11.0 K/uL    RBC 4.58 4.20 - 5.90 M/uL    Hemoglobin 13.9 13.5 - 17.5 g/dL    Hematocrit 40.4 (L) 40.5 - 52.5 %    MCV 88.2 80.0 - 100.0 fL    MCH 30.3 26.0 - 34.0 pg    MCHC 34.4 31.0 - 36.0 g/dL    RDW 13.3 12.4 - 15.4 %    Platelets 018 601 - 980 K/uL    MPV 8.5 5.0 - 10.5 fL    Neutrophils % 59.9 %    Lymphocytes % 31.5 %    Monocytes % 5.5 %    Eosinophils % 2.7 %    Basophils % 0.4 %    Neutrophils # 4.9 1.7 - 7.7 K/uL    Lymphocytes # 2.6 1.0 - 5.1 K/uL    Monocytes # 0.5 0.0 - 1.3 K/uL    Eosinophils # 0.2 0.0 - 0.6 K/uL    Basophils # 0.0 0.0 - 0.2 K/uL   Comprehensive Metabolic Panel w/ Reflex to MG   Result Value Ref Range    Sodium 136 136 - 145 mmol/L    Potassium reflex Magnesium 3.7 3.5 - 5.1 mmol/L    Chloride 102 99 - 110 mmol/L    CO2 24 21 - 32 mmol/L    Anion Gap 10 3 - 16    Glucose 200 (H) 70 - 99 mg/dL    BUN 15 7 - 20 mg/dL    CREATININE 0.8 (L) 0.9 - 1.3 mg/dL    GFR Non-African American >60 >60    GFR African American >60 >60    Calcium 9.8 8.3 - 10.6 mg/dL    Total Protein 7.1 6.4 - 8.2 g/dL    Alb 4.3 3.4 - 5.0 g/dL    Albumin/Globulin Ratio 1.5 1.1 - 2.2    Total Bilirubin 0.4 0.0 - 1.0 mg/dL    Alkaline Phosphatase 93 40 - 129 U/L    ALT 50 (H) 10 - 40 U/L    AST 28 15 - 37 U/L    Globulin 2.8 g/dL   Troponin   Result Value Ref Range    Troponin <0.01 <0.01 ng/mL   D-dimer, quantitative   Result Value Ref Range    D-Dimer, Quant <200 0 - 229 ng/mL DDU       PROCEDURES      MEDICAL DECISION MAKING  REVIEW OF PREVIOUS RECORDS  Cardiology note for office visit 6/27/18:   on 3/11/18-3/13/18 for CP. On 3/12/18 he had a [facility] PCI and POBA of mid LAD 2 resolute arminda drug stents    Echo 3/13/18  Summary   Left ventricular systolic function is mildly reduced with an ejection   fraction of 45-50 %.    Hypokinetic apex and anteroapical wall.   Mild concentric left ventricular hypertrophy.   Grade I diastolic dysfunction with normal filling pressure.   Mild mitral regurgitation.   Estimated systolic pulmonic artery pressure (SPAP) is normal at 31 mmHg   assuming a right atrial pressure of 3 mmHg.     LEFT HEART CATH (natali) (3/12/18)  LM: very short, dual ostium  LAD: mid 99% with thrombus, dye staining and Jayson-1 flow  LCX: Dominant, luminals              Large Om1 and OM2 acting as PLOM with luminals  RCA: small, nondominant     LVEDP:12  LVEF:55%, very mild anterior wall HK  No obvious AS or MR     PCI of mid LAD  PCi to mid LAD using Arminda 3.5 x 34 mm and then a [place] higher than the risk of the patient having a pulmonary embolus. It is, therefore, in the patient¢s best interest not to do additional emergent testing or be hospitalized. I have discussed with the patient my clinical impression and the result of an evidence-based clinical evaluation to screen for pulmonary embolus, as well as the risk of further   hospitalization. The evidence shows that the risk for pulmonary embolus is about 1% or less. Although the risk of pulmonary has not been eliminated, the risks of further hospitalization likely exceeds the benefit, and the patient declines further emergent evaluation or hospitalization for pulmonary embolus. HEART SCORE:    History: +0 for low suspicion  \"Highly suspicious\" = middle of left-sided, heavy chest pain, initiated by exercise, emotions or cold, radiation and/or relief of symptoms by sublingual nitrates  EKG: +0 for normal EKG   \"Nonspecific changes\" = nonspecific depolarization, LVH or any bundle branch block (even if old)  Age: +4 for age >65 years  Risk factors (includes HLD, HTN, DM, tobacco use, obesity, and +FHx): +2 for known CAD or 3+ risk factors  \"Smoking\" = Current or within the past month, \"family history\" = parents, siblings, children with diagnoses of CAD  \"Obesity\" = BMI > 30  Initial troponin: +0 for negative troponin    Heart score: 3. This falls under the following category: Score of 0-3, which indicates a very low risk (0.9-1.7%) for major adverse cardiac event and supports early discharge. I completed a HEART Score or HEART PATHWAY to screen for Acute Coronary Syndrome (ACS) in this patient. The evidence indicates that the patient is very low risk for ACS and this is consistent with my clinical intuition. The risk of further workup or hospitalization is likely higher than the risk of the patient having an ACS. It is, therefore, in the patient¢s best interest not to do additional emergent testing or be hospitalized.  I have discussed with the patient my clinical impression and the result of the HEART SCORE or HEART PATHWAY to screen for ACS, as well as the risks of further testing and hospitalization. The HEART SCORE or HEART PATHWAY shows that the risk for ACS is less than 2% or 1%, respectively. Although the risk of ACS has not been eliminated, the risks of further testing or hospitalization likely exceed the benefit, and the patient declines further emergent evaluation or hospitalization for ACS. THORACIC AORTIC DISSECTION SCREENING (TADS):    Associated New Neuro Deficies?: +0  No  Radial/Femoral Pulses Feel Uneven?: +0  No  Pain Maximal at Onset or Abrupt?: +0  No  Pain severe, ripping, or tearing?: +0  No  Migrates:  Chest, back, or abdomen?: +0  No  Chest XR Normal?: -1 Yes  A normal chest x-ray is defined as 1. Normal mediastinum, and 2. No pleural effusion, and 3. no apical pleural (curb density of the lung apex)    TADS score: <0.  This falls under the following category: Score of 0, odds of aortic dissection is <  1/1000, no further workup needed regarding the aorta and supports discharge    I engaged in a shared decision making discussion with the patient about the risk and potential benefits of CT scanning and they concurred with the plan to proceed without a CT scan as the risk is deemed a outweigh any potential benefit at this time. I discussed this presentation with Dr. Cornelius Almanza, who is on-call for cardiology. He agreed with my assessment and plan. When I went back to discuss the second negative troponin with the patient and his wife his wife mentioned that the patient's been feeling a burning sensation in epigastric region that has been ongoing for 6 weeks, he's had increased burping. She states sometimes the pain is worse in the morning after waking up sometimes is worse after eating the interim. Patient couldn't give me anything more specific than that.   It does sound similar like dyspepsia, I explained to patient I'll start him on a PPI and see if that will help control his symptoms. Patient did also ask for antianxiety medications I told him that I was not comfortable prescribing benzos but I will start him on an antihistamine to see if that'll help his symptoms. This is a very pleasant patient with chest pain. On physical exam, patient does not have any abnormal heart or lung sounds. The work up in the ED indicates patient is without significant evidence of acute coronary syndrome, pulmonary embolism, thoracic aortic dissection, pericarditis, pneumothorax, esophageal rupture, pneumonia, toxicity, shock, sepsis, unstable arrhythmia, hemodynamic or cardiopulmonary instability, herpes zoster, or any disease process requiring other immediate medical or surgical intervention at this time. It is understood that if the patient is not improving as expected or if other new symptoms or signs of concern develop, other etiologies or diagnoses may need to be considered requiring other tests, treatments, consultations, and/or admission. The diagnosis, plan, expected course, follow-up, and return precautions were discussed and all questions were answered. Final Impression    1. Chest pain, unspecified type    2. Fatigue, unspecified type    3. Elevated blood pressure reading    4. Dyspepsia    5. Anxiety state        Blood pressure 119/87, pulse 66, temperature 97.7 °F (36.5 °C), temperature source Oral, resp. rate 16, weight 205 lb (93 kg), SpO2 98 %. Patient and/or companions verbalized understanding of the ED workup, any relevant findings as well as any incidental findings, and the disposition and plan. Questions sought and answered with the patient and/or family members. They voice understanding and agree with plan. If the patient was discharged from the ED, they were instructed to return for any worsening or worrisome concerns. Patient was given scripts for the following medications.  I counseled patient how to take these medications. New Prescriptions    FAMOTIDINE (PEPCID) 20 MG TABLET    Take 1 tablet by mouth 2 times daily for 14 days    HYDROXYZINE (ATARAX) 10 MG TABLET    Take 1 tablet by mouth 3 times daily as needed for Anxiety       Disposition  Pt is in stable condition upon Discharge to home. The note was completed using Dragon voice recognition transcription. Every effort was made to ensure accuracy; however, inadvertent transcription errors may be present despite my best efforts to edit errors.     Tesha Shipman MD  [address] [facility]        Tesha Shipman MD  05/24/19 [id number]

## 2019-05-24 NOTE — ED NOTES
Patient asked how long he has had anxiety and he states \"Fuck lady I don't know\"     Ines Bell, MATTI  05/23/19 [id number]

## 2019-06-25 DIAGNOSIS — I25.10 CORONARY ARTERY DISEASE INVOLVING NATIVE CORONARY ARTERY OF NATIVE HEART WITHOUT ANGINA PECTORIS: ICD-10-CM

## 2019-06-25 RX ORDER — LOSARTAN POTASSIUM 50 MG/1
50 TABLET ORAL DAILY
Qty: 90 TABLET | Refills: 3 | Status: SHIPPED | OUTPATIENT
Start: 2019-06-25 | End: 2021-01-12 | Stop reason: SDUPTHER

## 2019-06-25 RX ORDER — ASPIRIN 81 MG/1
81 TABLET, CHEWABLE ORAL DAILY
Qty: 90 TABLET | Refills: 3 | Status: SHIPPED | OUTPATIENT
Start: 2019-06-25 | End: 2021-09-08 | Stop reason: SDUPTHER

## 2019-06-25 RX ORDER — METOPROLOL SUCCINATE 25 MG/1
25 TABLET, EXTENDED RELEASE ORAL 2 TIMES DAILY
Qty: 180 TABLET | Refills: 3 | Status: SHIPPED | OUTPATIENT
Start: 2019-06-25 | End: 2020-08-05

## 2019-06-25 NOTE — TELEPHONE ENCOUNTER
Refill on metoprolol succinate (TOPROL XL) 25 MG extended release tablet  losartan (COZAAR) 50 MG tablet   ticagrelor (BRILINTA) 90 MG TABS tablet   aspirin 81 MG chewable tablet     :  [facility]  for Allie Moreno 53

## 2020-02-11 NOTE — PROGRESS NOTES
infarction) ([facility] [address] Surgical History:   has a past surgical history that includes Colonoscopy; Percutaneous Transluminal Coronary Angio (03/12/2018); and Coronary angioplasty with stent. Social History:   reports that he has quit smoking. He smoked 1.00 pack per day. He has never used smokeless tobacco. He reports current drug use. Drug: Marijuana. He reports that he does not drink alcohol. Family History:  No evidence for sudden cardiac death or premature CAD    Home Medications:  Reviewed and are listed in nursing record. and/or listed below  Current Outpatient Medications   Medication Sig Dispense Refill    aspirin 81 MG chewable tablet Take 1 tablet by mouth daily 90 tablet 3    metoprolol succinate (TOPROL XL) 25 MG extended release tablet Take 1 tablet by mouth 2 times daily 180 tablet 3    ticagrelor (BRILINTA) 90 MG TABS tablet Take 1 tablet by mouth 2 times daily 180 tablet 3    losartan (COZAAR) 50 MG tablet Take 1 tablet by mouth daily 90 tablet 3    atorvastatin (LIPITOR) 40 MG tablet Take 1 tablet by mouth nightly 90 tablet 3    famotidine (PEPCID) 20 MG tablet Take 1 tablet by mouth 2 times daily for 14 days 28 tablet 0    nitroGLYCERIN (NITROSTAT) 0.4 MG SL tablet up to max of 3 total doses. If no relief after 1 dose, call 911. 25 tablet 3    Omega-3 Fatty Acids (FISH OIL) 1000 MG CAPS Take 3,000 mg by mouth 3 times daily       No current facility-administered medications for this visit. Allergies:  Patient has no known allergies. Review of Systems:   A 14 point review of symptoms completed. Pertinent positives identified in the HPI, all other review of symptoms negative as below.     Objective:   PHYSICAL EXAM:    Vitals:    02/12/20 1422   BP: 116/76   Pulse: 72   SpO2: 98%    Weight: 195 lb (88.5 kg)     Wt Readings from Last 3 Encounters:   02/12/20 195 lb (88.5 kg)   05/23/19 205 lb (93 kg)   01/17/19 205 lb (93 kg)         General Appearance:  Alert, cooperative, no distress, appears stated age   Head:  Normocephalic, atraumatic   Eyes:  PERRL, conjunctiva/corneas clear   Nose: Nares normal, no drainage or sinus tenderness   Throat: Lips, mucosa, and tongue normal   Neck: Supple, symmetrical, trachea midline, NL thyroid no carotid bruit or JVD   Lungs:   CTAB, respirations unlabored   Chest Wall:  No tenderness or deformity   Heart:  Regular rhythm and normal rate; S1, S2 are normal;   no murmur noted; no rub or gallop   Abdomen:   Soft, non-tender, +BS x 4, no masses, no organomegaly   Extremities: Extremities normal, atraumatic, no cyanosis or edema   Pulses: 2+ and symmetric   Skin: Skin color, texture, turgor normal, no rashes or lesions   Pysch: Normal mood and affect   Neurologic: Normal gross motor and sensory exam.         LABS   CBC:      Lab Results   Component Value Date    WBC 8.3 05/23/2019    RBC 4.58 05/23/2019    HGB 13.9 05/23/2019    HCT 40.4 05/23/2019    MCV 88.2 05/23/2019    RDW 13.3 05/23/2019     05/23/2019     CMP:  Lab Results   Component Value Date     05/23/2019    K 3.7 05/23/2019     05/23/2019    CO2 24 05/23/2019    BUN 15 05/23/2019    CREATININE 0.8 05/23/2019    GFRAA >60 05/23/2019    AGRATIO 1.5 05/23/2019    LABGLOM >60 05/23/2019    GLUCOSE 200 05/23/2019    PROT 7.1 05/23/2019    CALCIUM 9.8 05/23/2019    BILITOT 0.4 05/23/2019    ALKPHOS 93 05/23/2019    AST 28 05/23/2019    ALT 50 05/23/2019     PT/INR:   No results found for: PTINR  Liver:  No components found for: CHLPL  Lab Results   Component Value Date    ALT 50 (H) 05/23/2019    AST 28 05/23/2019    ALKPHOS 93 05/23/2019    BILITOT 0.4 05/23/2019     No results found for: LABA1C  Lipids:         Lab Results   Component Value Date    TRIG 70 03/12/2018    TRIG 199 (H) 01/05/2017    TRIG 201 (H) 07/07/2016            Lab Results   Component Value Date    HDL 37 (L) 03/12/2018    HDL 29 (L) 01/05/2017    HDL 30 (L) 07/07/2016            Lab Results   Component Dano Baltazar MD, [address] [facility] Cardiologist  [facility]  [phone] [facility]  [phone] [address] [place] [address]  2/12/2020  2:37 PM

## 2020-02-12 ENCOUNTER — OFFICE VISIT (OUTPATIENT)
Dept: CARDIOLOGY CLINIC | Age: 60
End: 2020-02-12
Payer: COMMERCIAL

## 2020-02-12 ENCOUNTER — HOSPITAL ENCOUNTER (OUTPATIENT)
Age: 60
Discharge: HOME | End: 2020-02-12
Payer: COMMERCIAL

## 2020-02-12 VITALS
HEART RATE: 72 BPM | BODY MASS INDEX: 27.92 KG/M2 | WEIGHT: 195 LBS | OXYGEN SATURATION: 98 % | SYSTOLIC BLOOD PRESSURE: 116 MMHG | DIASTOLIC BLOOD PRESSURE: 76 MMHG | HEIGHT: 70 IN

## 2020-02-12 LAB
CHOLESTEROL, FASTING: 106 MG/DL (ref 0–199)
HDLC SERPL-MCNC: 33 MG/DL (ref 40–60)
LDL CHOLESTEROL CALCULATED: 59 MG/DL
TRIGLYCERIDE, FASTING: 72 MG/DL (ref 0–150)
VLDLC SERPL CALC-MCNC: 14 MG/DL

## 2020-02-12 PROCEDURE — 36415 COLL VENOUS BLD VENIPUNCTURE: CPT

## 2020-02-12 PROCEDURE — 99214 OFFICE O/P EST MOD 30 MIN: CPT | Performed by: INTERNAL MEDICINE

## 2020-02-12 PROCEDURE — 80061 LIPID PANEL: CPT

## 2020-08-05 RX ORDER — METOPROLOL SUCCINATE 25 MG/1
TABLET, EXTENDED RELEASE ORAL
Qty: 180 TABLET | Refills: 3 | Status: SHIPPED | OUTPATIENT
Start: 2020-08-05

## 2021-01-11 NOTE — TELEPHONE ENCOUNTER
Medication Refill    Medication needing refilled:  losartan (COZAAR) 50 MG tablet      Dosage of the medication:    How are you taking this medication (QD, BID, TID, QID, PRN):  Take 1 tablet by mouth daily  30 or 90 day supply called in:  30  When will you run out of your medication:  OUT OF MEDS NOW  Which Pharmacy are we sending the medication to?:  [address]   [address], [address] [place] [address]   Phone:  [phone]  Fax:  [phone]

## 2021-01-12 RX ORDER — LOSARTAN POTASSIUM 50 MG/1
50 TABLET ORAL DAILY
Qty: 90 TABLET | Refills: 3 | Status: SHIPPED | OUTPATIENT
Start: 2021-01-12 | End: 2021-02-19 | Stop reason: SDUPTHER

## 2021-02-19 ENCOUNTER — OFFICE VISIT (OUTPATIENT)
Dept: CARDIOLOGY CLINIC | Age: 61
End: 2021-02-19
Payer: COMMERCIAL

## 2021-02-19 VITALS
DIASTOLIC BLOOD PRESSURE: 80 MMHG | HEART RATE: 72 BPM | OXYGEN SATURATION: 99 % | HEIGHT: 70 IN | BODY MASS INDEX: 27.7 KG/M2 | SYSTOLIC BLOOD PRESSURE: 110 MMHG | WEIGHT: 193.5 LBS

## 2021-02-19 DIAGNOSIS — E78.5 DYSLIPIDEMIA: ICD-10-CM

## 2021-02-19 DIAGNOSIS — I25.10 CORONARY ARTERY DISEASE INVOLVING NATIVE CORONARY ARTERY OF NATIVE HEART WITHOUT ANGINA PECTORIS: ICD-10-CM

## 2021-02-19 DIAGNOSIS — I10 ESSENTIAL HYPERTENSION: Primary | ICD-10-CM

## 2021-02-19 DIAGNOSIS — I25.5 ISCHEMIC CARDIOMYOPATHY: ICD-10-CM

## 2021-02-19 PROCEDURE — 99214 OFFICE O/P EST MOD 30 MIN: CPT | Performed by: NURSE PRACTITIONER

## 2021-02-19 RX ORDER — ATORVASTATIN CALCIUM 40 MG/1
40 TABLET, FILM COATED ORAL NIGHTLY
Qty: 90 TABLET | Refills: 3 | Status: SHIPPED | OUTPATIENT
Start: 2021-02-19 | End: 2022-07-14 | Stop reason: SDUPTHER

## 2021-02-19 RX ORDER — LOSARTAN POTASSIUM 50 MG/1
50 TABLET ORAL DAILY
Qty: 90 TABLET | Refills: 3 | Status: SHIPPED | OUTPATIENT
Start: 2021-02-19 | End: 2021-12-21 | Stop reason: SDUPTHER

## 2021-02-19 RX ORDER — NITROGLYCERIN 0.4 MG/1
TABLET SUBLINGUAL
Qty: 25 TABLET | Refills: 3 | Status: SHIPPED | OUTPATIENT
Start: 2021-02-19

## 2021-02-19 ASSESSMENT — ENCOUNTER SYMPTOMS
GASTROINTESTINAL NEGATIVE: 1
RESPIRATORY NEGATIVE: 1

## 2021-02-19 NOTE — PATIENT INSTRUCTIONS
Everything looks great today, good job!   Continue current medications  Stay active along with a healthy diet  Follow up in 1 year

## 2021-02-19 NOTE — PROGRESS NOTES
[address]   Cardiology Note              Date:  February 19, 2021  Patientname: Raimundo Zepeda  YOB: 1960    Primary Care physician: Abelardo Aquino MD    HISTORY OF PRESENT ILLNESS: Raimundo Zepeda is a 61 y.o. male with a history of CAD, cardiomyopathy, HTN, HLD. He was admitted 3/11/2018 for chest pain. Troponin elevated. [facility] 3/12/2018 showed LAD lesion treated with EYAD x2. Echo showed EF 45-50%. Echo 8/2018 showed EF 55%. Today he presents for yearly follow up for CAD, HTN, HLD. He feels good. Denies chest pain, shortness of breath, palpitations and dizziness. He is active at home but does have increased stress. He does not check BP at home. Past Medical History:   has a past medical history of CAD (coronary artery disease), Hyperlipidemia, Hypertension, and MI (myocardial infarction) ([facility] [address] Past Surgical History:   has a past surgical history that includes Colonoscopy; Percutaneous Transluminal Coronary Angio (03/12/2018); and Coronary angioplasty with stent. Home Medications:    Prior to Admission medications    Medication Sig Start Date End Date Taking? Authorizing Provider   losartan (COZAAR) 50 MG tablet Take 1 tablet by mouth daily 1/12/21  Yes Lindsey Kendall MD   metoprolol succinate (TOPROL XL) 25 MG extended release tablet TAKE 1 TABLET TWICE A DAY 8/5/20  Yes Lindsey Kendall MD   ticagrelor (BRILINTA) 60 MG TABS tablet Take 1 tablet by mouth 2 times daily 2/12/20  Yes Lindsey Kendall MD   aspirin 81 MG chewable tablet Take 1 tablet by mouth daily 6/25/19  Yes Lindsey Kendall MD   nitroGLYCERIN (NITROSTAT) 0.4 MG SL tablet up to max of 3 total doses.  If no relief after 1 dose, call 911. 5/24/19  Yes Lindsey Kendall MD   atorvastatin (LIPITOR) 40 MG tablet Take 1 tablet by mouth nightly 12/21/18  Yes Jazmin Riley MD   Omega-3 Fatty Acids (FISH OIL) 1000 MG CAPS Take 3,000 mg by mouth 3 times daily   Yes Historical Provider, MD   famotidine (PEPCID) 20 MG tablet Take 1 tablet by mouth 2 times daily for 14 days 5/24/19 6/7/19  Namita Mckeon MD     Allergies:  Patient has no known allergies. Social History:   reports that he has quit smoking. He smoked 1.00 pack per day. He has never used smokeless tobacco. He reports current drug use. Drug: Marijuana. He reports that he does not drink alcohol. Family History: family history is not on file. Review of Systems   Review of Systems   Constitutional: Negative. Respiratory: Negative. Cardiovascular: Negative. Gastrointestinal: Negative. Neurological: Negative. OBJECTIVE:    Vital signs:    Ht 5' 10\" (1.778 m)   Wt 193 lb 8 oz (87.8 kg)   BMI 27.76 kg/m²      Physical Exam:  Constitutional:  Comfortable and alert, NAD, appears stated age  Eyes: PERRL, sclera nonicteric  Neck:  Supple, no masses, no thyroidmegaly, no JVD  Skin:  Warm and dry; no rash or lesions  Heart: Regular, normal apex, S1 and S2 normal, no M/G/R  Lungs:  Normal respiratory effort; clear; no wheezing/rhonchi/rales  Abdomen: soft, non tender, + bowel sounds  Extremities:  No edema or cyanosis; no clubbing  Neuro: alert and oriented, moves legs and arms equally, normal mood and affect    Data Reviewed:      Echo 8/2018:   Limited echo for LV function. Normal left ventricle systolic function with an estimated ejection fraction   of 55%. No regional wall motion abnormalities are seen. Normal left ventricular diastolic filling pressure. Compared to last echo on 3/13/2018 (EF 45-50%), left ventricle systolic   function has improved. Echo 3/13/2018:  Left ventricular systolic function is mildly reduced with an ejection   fraction of 45-50 %.    Hypokinetic apex and anteroapical wall.   Mild concentric left ventricular hypertrophy.   Grade I diastolic dysfunction with normal filling pressure.   Mild mitral regurgitation.   Estimated systolic pulmonic artery pressure (SPAP) is normal at 31 mmHg   assuming a right atrial pressure of 3 mmHg. Coronary angiogram 3/12/2018:  LM: very short, dual ostium  LAD: mid 99% with thrombus, dye staining and Jayson-1 flow  LCX: Dominant, luminals              Large Om1 and OM2 acting as PLOM with luminals  RCA: small, nondominant  LVEDP:12  LVEF:55%, very mild anterior wall HK  No obvious AS or MR  PCI of mid LAD  PCi to mid LAD using Jay 3.5 x 34 mm and then a [place] 3.5 x 8 mm to proximal stent edge for edge dissection. Assessment  1. Successful PCI and POBA to mid LAD using 2 Resolute Jay drug stents              99%/thrombus reduced to 0% and JAYSON-3 flow  2. Cont integrillin for 12 hrs  3. ASA 81mg qday for life  4. Ticagrelor 90mg po BID for 1 yr w/o interruption  5. Echo  6.  Lipitor, add BB  7. Cardiac rehab    Cardiology Labs Reviewed:   CBC: No results for input(s): WBC, HGB, HCT, PLT in the last 72 hours. BMP:No results for input(s): NA, K, CO2, BUN, CREATININE, LABGLOM, GLUCOSE in the last 72 hours. PT/INR: No results for input(s): PROTIME, INR in the last 72 hours. APTT:No results for input(s): APTT in the last 72 hours. FASTING LIPID PANEL:  Lab Results   Component Value Date    HDL 33 02/12/2020    LDLCALC 59 02/12/2020    TRIG 70 03/12/2018     LIVER PROFILE:No results for input(s): AST, ALT, ALB in the last 72 hours. BNP: No results found for: PROBNP    Reviewed all labs and imaging today    Assessment:   CAD: stable, no angina; s/p EYAD x2 LAD in the setting of NSTEMI 3/2018  Ischemic cardiomyopathy: recovered, EF 55% on echo 8/2018 from 45-50% in 3/2018  HTN: controlled  HLD: controlled, LDL 58, continue statin    Plan:   1. Yearly lipids, LFTs, CBC, BMP through PCP  2. Continue aspirin, statin, brilinta, losartan, toprol; he prefers to continue DAPT at this time, can hold brilinta if needed for procedures  3. Stay active along with a healthy diet  4. Check BP at home and call the office if consistently out of goal range  5.  Follow up in 1 year with Dr. Dhaval Garcia, KRISTEN-CNP  [address]  [phone]

## 2021-02-19 NOTE — LETTER
[address]   Cardiology Note              Date:  February 19, 2021  Patientname: Margarita Shell  YOB: 1960    Primary Care physician: Funmilayo Hanna MD    HISTORY OF PRESENT ILLNESS: Margarita Shell is a 61 y.o. male with a history of CAD, cardiomyopathy, HTN, HLD. He was admitted 3/11/2018 for chest pain. Troponin elevated. [facility] 3/12/2018 showed LAD lesion treated with EYAD x2. Echo showed EF 45-50%. Echo 8/2018 showed EF 55%. Today he presents for yearly follow up for CAD, HTN, HLD. He feels good. Denies chest pain, shortness of breath, palpitations and dizziness. He is active at home but does have increased stress. He does not check BP at home. Past Medical History:   has a past medical history of CAD (coronary artery disease), Hyperlipidemia, Hypertension, and MI (myocardial infarction) ([facility] [address] Past Surgical History:   has a past surgical history that includes Colonoscopy; Percutaneous Transluminal Coronary Angio (03/12/2018); and Coronary angioplasty with stent. Home Medications:    Prior to Admission medications    Medication Sig Start Date End Date Taking? Authorizing Provider   losartan (COZAAR) 50 MG tablet Take 1 tablet by mouth daily 1/12/21  Yes Mumtaz Montanez MD   metoprolol succinate (TOPROL XL) 25 MG extended release tablet TAKE 1 TABLET TWICE A DAY 8/5/20  Yes Mumtaz Montanez MD   ticagrelor (BRILINTA) 60 MG TABS tablet Take 1 tablet by mouth 2 times daily 2/12/20  Yes Mumtaz Montanez MD   aspirin 81 MG chewable tablet Take 1 tablet by mouth daily 6/25/19  Yes Mumtaz Montanez MD   nitroGLYCERIN (NITROSTAT) 0.4 MG SL tablet up to max of 3 total doses.  If no relief after 1 dose, call 911. 5/24/19  Yes Mumtaz Montanez MD   atorvastatin (LIPITOR) 40 MG tablet Take 1 tablet by mouth nightly 12/21/18  Yes Whitney Hdz MD   Omega-3 Fatty Acids (FISH OIL) 1000 MG CAPS Take 3,000 mg by mouth 3 times daily   Yes Historical Provider, MD famotidine (PEPCID) 20 MG tablet Take 1 tablet by mouth 2 times daily for 14 days 5/24/19 6/7/19  Felicia Alvarado MD     Allergies:  Patient has no known allergies. Social History:   reports that he has quit smoking. He smoked 1.00 pack per day. He has never used smokeless tobacco. He reports current drug use. Drug: Marijuana. He reports that he does not drink alcohol. Family History: family history is not on file. Review of Systems   Review of Systems   Constitutional: Negative. Respiratory: Negative. Cardiovascular: Negative. Gastrointestinal: Negative. Neurological: Negative. OBJECTIVE:    Vital signs:    Ht 5' 10\" (1.778 m)   Wt 193 lb 8 oz (87.8 kg)   BMI 27.76 kg/m²      Physical Exam:  Constitutional:  Comfortable and alert, NAD, appears stated age  Eyes: PERRL, sclera nonicteric  Neck:  Supple, no masses, no thyroidmegaly, no JVD  Skin:  Warm and dry; no rash or lesions  Heart: Regular, normal apex, S1 and S2 normal, no M/G/R  Lungs:  Normal respiratory effort; clear; no wheezing/rhonchi/rales  Abdomen: soft, non tender, + bowel sounds  Extremities:  No edema or cyanosis; no clubbing  Neuro: alert and oriented, moves legs and arms equally, normal mood and affect    Data Reviewed:      Echo 8/2018:   Limited echo for LV function. Normal left ventricle systolic function with an estimated ejection fraction   of 55%. No regional wall motion abnormalities are seen. Normal left ventricular diastolic filling pressure. Compared to last echo on 3/13/2018 (EF 45-50%), left ventricle systolic   function has improved. Echo 3/13/2018:  Left ventricular systolic function is mildly reduced with an ejection   fraction of 45-50 %.    Hypokinetic apex and anteroapical wall.   Mild concentric left ventricular hypertrophy.   Grade I diastolic dysfunction with normal filling pressure.   Mild mitral regurgitation.   Estimated systolic pulmonic artery pressure (SPAP) is normal at 31 mmHg  assuming a right atrial pressure of 3 mmHg. Coronary angiogram 3/12/2018:  LM: very short, dual ostium  LAD: mid 99% with thrombus, dye staining and Eryn-1 flow  LCX: Dominant, luminals              Large Om1 and OM2 acting as PLOM with luminals  RCA: small, nondominant  LVEDP:12  LVEF:55%, very mild anterior wall HK  No obvious AS or MR  PCI of mid LAD  PCi to mid LAD using Jay 3.5 x 34 mm and then a [place] 3.5 x 8 mm to proximal stent edge for edge dissection. Assessment  1. Successful PCI and POBA to mid LAD using 2 Resolute Jay drug stents              99%/thrombus reduced to 0% and ERYN-3 flow  2. Cont integrillin for 12 hrs  3. ASA 81mg qday for life  4. Ticagrelor 90mg po BID for 1 yr w/o interruption  5. Echo  6.  Lipitor, add BB  7. Cardiac rehab    Cardiology Labs Reviewed:   CBC: No results for input(s): WBC, HGB, HCT, PLT in the last 72 hours. BMP:No results for input(s): NA, K, CO2, BUN, CREATININE, LABGLOM, GLUCOSE in the last 72 hours. PT/INR: No results for input(s): PROTIME, INR in the last 72 hours. APTT:No results for input(s): APTT in the last 72 hours. FASTING LIPID PANEL:  Lab Results   Component Value Date    HDL 33 02/12/2020    LDLCALC 59 02/12/2020    TRIG 70 03/12/2018     LIVER PROFILE:No results for input(s): AST, ALT, ALB in the last 72 hours. BNP: No results found for: PROBNP    Reviewed all labs and imaging today    Assessment:   CAD: stable, no angina; s/p EYAD x2 LAD in the setting of NSTEMI 3/2018  Ischemic cardiomyopathy: recovered, EF 55% on echo 8/2018 from 45-50% in 3/2018  HTN: controlled  HLD: controlled, LDL 58, continue statin    Plan:   1. Yearly lipids, LFTs, CBC, BMP through PCP  2. Continue aspirin, statin, brilinta, losartan, toprol; he prefers to continue DAPT at this time, can hold brilinta if needed for procedures  3. Stay active along with a healthy diet  4.  Check BP at home and call the office if consistently out of goal range 5. Follow up in 1 year with Dr. Ted Riddle, APRN-CNP  [facility]  [phone]

## 2021-03-04 ENCOUNTER — IMMUNIZATION (OUTPATIENT)
Dept: PRIMARY CARE CLINIC | Age: 61
End: 2021-03-04
Payer: OTHER GOVERNMENT

## 2021-03-04 PROCEDURE — 0001A COVID-19, PFIZER VACCINE 30MCG/0.3ML DOSE: CPT | Performed by: FAMILY MEDICINE

## 2021-03-04 PROCEDURE — 91300 COVID-19, PFIZER VACCINE 30MCG/0.3ML DOSE: CPT | Performed by: FAMILY MEDICINE

## 2021-03-25 ENCOUNTER — IMMUNIZATION (OUTPATIENT)
Dept: PRIMARY CARE CLINIC | Age: 61
End: 2021-03-25
Payer: OTHER GOVERNMENT

## 2021-03-25 PROCEDURE — 0002A COVID-19, PFIZER VACCINE 30MCG/0.3ML DOSE: CPT | Performed by: FAMILY MEDICINE

## 2021-03-25 PROCEDURE — 91300 COVID-19, PFIZER VACCINE 30MCG/0.3ML DOSE: CPT | Performed by: FAMILY MEDICINE

## 2021-06-03 NOTE — LETTER
[FreeTextEntry1] : 70 yo female with newly diagnosed left breast cancer sent to the office by Dr Dorsey to discuss reconstructive surgery\par \par The patient was counseled about reconstructive options following mastectomy, including autologus, prosthetic, and the options of foregoing reconstruction.  Additionally, she was explained the options regarding the timing of reconstruction, including immediate reconstruction at the time of mastectomy or delayed reconstruction at a later date. \par \par The patient was extensively counseled on the operation and the perioperative recoveries of both autologous and prosthetic reconstructions.  The patient understands the risks with abdominally based microsurgical reconstruction including partial or total flap loss, revisit to the operating room in the marino-operative period, wound dehiscence, mastectomy skin flap necrosis, fat necrosis, abdominal wall morbidity (laxity, bulge, hernia), asymmetry, parasthesia, seroma, hematoma, and infection.  She also understands the risks with implant-based reconstruction including infection, implant malposition, extrusion, deflation, capsular contracture, mastectomy skin flap necrosis, wound dehiscence, asymmetry, seroma, parasthesia, and hematoma. \par \par The patient understands all of these risks and she provides informed consent.\par \par  Hyperlipidemia, Hypertension, and MI (myocardial infarction) ([facility] [address] Surgical History:   has a past surgical history that includes Colonoscopy; Percutaneous Transluminal Coronary Angio (03/12/2018); and Coronary angioplasty with stent. Social History:   reports that he has quit smoking. He smoked 1.00 pack per day. He has never used smokeless tobacco. He reports current drug use. Drug: Marijuana. He reports that he does not drink alcohol. Family History:  No evidence for sudden cardiac death or premature CAD    Home Medications:  Reviewed and are listed in nursing record. and/or listed below  Current Outpatient Medications   Medication Sig Dispense Refill    aspirin 81 MG chewable tablet Take 1 tablet by mouth daily 90 tablet 3    metoprolol succinate (TOPROL XL) 25 MG extended release tablet Take 1 tablet by mouth 2 times daily 180 tablet 3    ticagrelor (BRILINTA) 90 MG TABS tablet Take 1 tablet by mouth 2 times daily 180 tablet 3    losartan (COZAAR) 50 MG tablet Take 1 tablet by mouth daily 90 tablet 3    atorvastatin (LIPITOR) 40 MG tablet Take 1 tablet by mouth nightly 90 tablet 3    famotidine (PEPCID) 20 MG tablet Take 1 tablet by mouth 2 times daily for 14 days 28 tablet 0    nitroGLYCERIN (NITROSTAT) 0.4 MG SL tablet up to max of 3 total doses. If no relief after 1 dose, call 911. 25 tablet 3    Omega-3 Fatty Acids (FISH OIL) 1000 MG CAPS Take 3,000 mg by mouth 3 times daily       No current facility-administered medications for this visit. Allergies:  Patient has no known allergies. Review of Systems:   A 14 point review of symptoms completed. Pertinent positives identified in the HPI, all other review of symptoms negative as below.     Objective:   PHYSICAL EXAM:    Vitals:    02/12/20 1422   BP: 116/76   Pulse: 72   SpO2: 98%    Weight: 195 lb (88.5 kg)     Wt Readings from Last 3 Encounters:   02/12/20 195 lb (88.5 kg)   05/23/19 205 lb (93 kg)   01/17/19 205 lb (93 kg) accuracy of this note have been reviewed by me to the best of my ability.            Jack Chau MD, [facility] Cardiologist  [address]  [phone] [facility]  [phone] [address] [place] [address]  2/12/2020  2:37 PM

## 2021-09-08 RX ORDER — ASPIRIN 81 MG/1
81 TABLET, CHEWABLE ORAL DAILY
Qty: 90 TABLET | Refills: 3 | Status: SHIPPED | OUTPATIENT
Start: 2021-09-08 | End: 2022-07-14 | Stop reason: SDUPTHER

## 2021-09-08 NOTE — TELEPHONE ENCOUNTER
Pt is needing a 90 day supply plus refills on his baby aspirin. Please send to [address]

## 2021-09-08 NOTE — TELEPHONE ENCOUNTER
Received refill request for [address]    Last OV: 02/19/2021 w/ MIL    Last Refill: 06/25/2019 #90 w/ 3 refills     Next Appointment: on recall list for appt on 02/19/2022 ame/ Jacqueline Rogers

## 2021-12-21 RX ORDER — LOSARTAN POTASSIUM 50 MG/1
50 TABLET ORAL DAILY
Qty: 90 TABLET | Refills: 3 | Status: SHIPPED | OUTPATIENT
Start: 2021-12-21 | End: 2022-05-31

## 2021-12-21 NOTE — TELEPHONE ENCOUNTER
Pt called and stated he put a refill request in with [facility] for Losartan 50 mg but pt is afraid with the holiday he will run out before he gets his prescription. Pt is requesting we send a short term supply to [facility] in [facility]. Last OV 2/19/2021 with MIL.

## 2022-05-17 ENCOUNTER — TELEPHONE (OUTPATIENT)
Dept: CARDIOLOGY CLINIC | Age: 62
End: 2022-05-17

## 2022-05-17 NOTE — TELEPHONE ENCOUNTER
CARDIAC CLEARANCE REQUEST    What type of procedure are you having:  Shoulder surgery  Are you taking any blood thinners:  Brillianta 60mg, Asprin 81mg  Type on anesthesia:  General  When is your procedure scheduled for:  06/15/2022  What physician is performing your procedure:  Dr. Filemon Cordova  Phone Number:    Fax number to send the letter:  [phone]    Dr. Filemon Cordova wants pt to hold blood thinners for 3-5 days. Last ov 02/19/2021 [facility].  Upcoming ov 05/26/2022 [facility]

## 2022-05-17 NOTE — TELEPHONE ENCOUNTER
KAN, pleasea dvise on cardiac clearance. MD asking to hold ASA and Brilinta for 3-5 days prior.      Last OV NPTEO 2/19/2021  Last OV KAN 2/12/2020

## 2022-05-18 NOTE — TELEPHONE ENCOUNTER
I have not seen patient for 2 years nor has he followed up since then so not sure where his refills were being done at. However if he is having chest pain or shortness of breath or anything similar to prior PCI he needs to see us in office prior to clearance. If he is doing well with no chest pain trouble breathing or shortness of breath. Walking around with no issues then okay to proceed with surgery at low cardiac risk.   He can stop Brilinta for 1 week prior but I would highly suggest continuing baby aspirin throughout surgery

## 2022-05-24 NOTE — PROGRESS NOTES
[facility]   Cardiovascular Evaluation    PATIENT: Jasmine Albright  DATE: 2022  MRN: [id number]  CSN: [id number]  : 1960      Primary Care Doctor: Brendan Beatty MD  Reason for evaluation:   Cardiac Clearance (Shoulder repair ), Coronary Artery Disease, Cardiomyopathy, Hyperlipidemia, and Hypertension      Subjective:   History of present illness on initial date of evaluation:   Jasmine Albright is a 64 y.o. patient who presents to our office for follow up. He has a past medical history of Cad, HTN, HLD.    3/12/18  [facility] PCI and POBA of mid LAD 2 resolute arminda drug stents. Today he states is here for cardiac clearance for right shoulder surgery. He has had chronic issues with shoulder pain. Patient denies current edema, chest pain, sob, palpitations, dizziness or syncope. Patient Active Problem List   Diagnosis    Tobacco abuse    Essential hypertension    Hyperlipidemia    Erectile dysfunction    Trigger finger of right thumb    Other chest pain    NSTEMI (non-ST elevated myocardial infarction) ([address]    Ischemic cardiomyopathy    Trigger finger of left thumb    Coronary artery disease         Past Medical History:   has a past medical history of CAD (coronary artery disease), Hyperlipidemia, Hypertension, and MI (myocardial infarction) ([address] Surgical History:   has a past surgical history that includes Colonoscopy; Percutaneous Transluminal Coronary Angio (2018); and Coronary angioplasty with stent. Social History:   reports that he has quit smoking. He smoked 1.00 pack per day. He has never used smokeless tobacco. He reports current alcohol use. He reports current drug use. Drug: Marijuana Margmaddie Castro). Family History:  No evidence for sudden cardiac death or premature CAD    Home Medications:  Reviewed and are listed in nursing record.  and/or listed below  Current Outpatient Medications   Medication Sig Dispense Refill    meloxicam (MOBIC) 7.5 MG tablet Take 7.5 mg by mouth daily      losartan (COZAAR) 50 MG tablet Take 1 tablet by mouth daily 90 tablet 3    aspirin 81 MG chewable tablet Take 1 tablet by mouth daily 90 tablet 3    atorvastatin (LIPITOR) 40 MG tablet Take 1 tablet by mouth nightly 90 tablet 3    ticagrelor (BRILINTA) 60 MG TABS tablet Take 1 tablet by mouth 2 times daily 180 tablet 3    nitroGLYCERIN (NITROSTAT) 0.4 MG SL tablet up to max of 3 total doses. If no relief after 1 dose, call 911. 25 tablet 3    metoprolol succinate (TOPROL XL) 25 MG extended release tablet TAKE 1 TABLET TWICE A DAY (Patient taking differently: Take 25 mg by mouth daily ) 180 tablet 3    Omega-3 Fatty Acids (FISH OIL) 1000 MG CAPS Take 3,000 mg by mouth 3 times daily (Patient not taking: Reported on 5/26/2022)       No current facility-administered medications for this visit. Allergies:  Pravastatin     Review of Systems:   A 14 point review of symptoms completed. Pertinent positives identified in the HPI, all other review of symptoms negative as below.     Objective:   PHYSICAL EXAM:    Vitals:    05/26/22 1308   BP: 100/70   Pulse: 72   SpO2: 97%    Weight: 180 lb 8 oz (81.9 kg)     Wt Readings from Last 3 Encounters:   05/26/22 180 lb 8 oz (81.9 kg)   02/19/21 193 lb 8 oz (87.8 kg)   02/12/20 195 lb (88.5 kg)         General Appearance:  Alert, cooperative, no distress, appears stated age   Head:  Normocephalic, atraumatic   Eyes:  PERRL, conjunctiva/corneas clear   Nose: Nares normal, no drainage or sinus tenderness   Throat: Lips, mucosa, and tongue normal   Neck: Supple, symmetrical, trachea midline, NL thyroid no carotid bruit or JVD   Lungs:   CTAB, respirations unlabored   Chest Wall:  No tenderness or deformity   Heart:  Regular rhythm and normal rate; S1, S2 are normal;   no murmur noted; no rub or gallop   Abdomen:   Soft, non-tender, +BS x 4, no masses, no organomegaly   Extremities: Extremities normal, atraumatic, no cyanosis or edema Pulses: 2+ and symmetric   Skin: Skin color, texture, turgor normal, no rashes or lesions   Pysch: Normal mood and affect   Neurologic: Normal gross motor and sensory exam.         LABS   CBC:      Lab Results   Component Value Date    WBC 8.3 2019    RBC 4.58 2019    HGB 13.9 2019    HCT 40.4 2019    MCV 88.2 2019    RDW 13.3 2019     2019     CMP:  Lab Results   Component Value Date     2019    K 3.7 2019     2019    CO2 24 2019    BUN 15 2019    CREATININE 0.8 2019    GFRAA >60 2019    AGRATIO 1.5 2019    LABGLOM >60 2019    GLUCOSE 200 2019    PROT 7.1 2019    CALCIUM 9.8 2019    BILITOT 0.4 2019    ALKPHOS 93 2019    AST 28 2019    ALT 50 2019     PT/INR:   No results found for: PTINR  Liver:  No components found for: CHLPL  Lab Results   Component Value Date    ALT 50 (H) 2019    AST 28 2019    ALKPHOS 93 2019    BILITOT 0.4 2019     No results found for: LABA1C  Lipids:         Lab Results   Component Value Date    TRIG 70 2018    TRIG 199 (H) 2017    TRIG 201 (H) 2016            Lab Results   Component Value Date    HDL 33 (L) 2020    HDL 37 (L) 2018    HDL 29 (L) 2017            Lab Results   Component Value Date    LDLCALC 59 2020    LDLCALC 85 2018    LDLCALC 130 (H) 2017            Lab Results   Component Value Date    LABVLDL 14 2020    LABVLDL 14 2018    LABVLDL 40 2017         CARDIAC DATA   EK2018 NSR  ECG 2022. Personally interpreted. Sinus bradycardia at 53 bpm.  No ischemia injury or infarct    ECHO 3/13/18   Left ventricular systolic function is mildly reduced with an ejection   fraction of 45-50 %. Hypokinetic apex and anteroapical wall. Mild concentric left ventricular hypertrophy.    Grade I diastolic dysfunction with normal filling pressure. Mild mitral regurgitation. Estimated systolic pulmonic artery pressure (SPAP) is normal at 31 mmHg   assuming a right atrial pressure of 3 mmHg. STRESS TEST:    CARDIAC CATH: 3/12/18  LEFT HEART CATH  LM: very short, dual ostium  LAD: mid 99% with thrombus, dye staining and Eryn-1 flow  LCX: Dominant, luminals              Large Om1 and OM2 acting as PLOM with luminals  RCA: small, nondominant   LVEDP:12  LVEF:55%, very mild anterior wall HK  No obvious AS or MR   PCI of mid LAD  PCi to mid LAD using Jay 3.5 x 34 mm and then a Jay 3.5 x 8 mm to proximal stent edge for edge dissection. Assessment  1. Successful PCI and POBA to mid LAD using 2 Resolute Yale drug stents              99%/thrombus reduced to 0% and ERYN-3 flow  2. Cont integrillin for 12 hrs  3. ASA 81mg qday for life  4. Ticagrelor 90mg po BID for 1 yr w/o interruption  5. Echo  6. Lipitor, add BB  7. Cardiac rehab      VASCULAR/OTHER IMAGING:      Assessment and Plan   Anabell Cartwright is a 64 y.o. male who presents today for the following problems:      1. Chest pain: no issues  2. CAD              - 3/12/2018 STEMI with PCi to mid LAD x 2  3. Ischemic cardiomyopathy: LVEF 45%-->55%    - fully recovered  4. Tobacco abuse- Quit! 5. HTN: controlled  6. HLD: controlled  7. Preop: Rt Shoulder surgery  8. Hyperkalemia    MD PLAN  1. Patient continues to do well with no angina  2. Okay to proceed with surgery at low cardiac risk.   -Continue aspirin and Toprol. Hold Brilinta 1 week prior  3. Potassium has risen to 5.2 although patient is eating much more fruits and vegetable including bananas, potatoes strawberries and blueberries. Given excellent blood pressure control will drop losartan down to 25 mg daily.   -Prefer him to stay on a healthy diet and reduce medications if possible   -Recheck renal panel in 1 week after this change   -We will call blood pressures greater 130/80  4.   His weight is down at least 15 pounds since last visit due to the changes he has made which is likely the reason for improved blood pressure  5. His LDL is extremely well controlled but his HDL slightly low. Glycerides slightly elevated at 162. At this time rather than starting medication would suggest lifestyle modification including exercise but also focus on Mediterranean diet and \"good fats\". Patient Active Problem List   Diagnosis    Tobacco abuse    Essential hypertension    Hyperlipidemia    Erectile dysfunction    Trigger finger of right thumb    Other chest pain    NSTEMI (non-ST elevated myocardial infarction) ([address]    Ischemic cardiomyopathy    Trigger finger of left thumb    Coronary artery disease         Plan:  1. Recommend a mediterranean healthy diet (low salt, avoid red meat, avoid fatty or fried foods, lots of fruits and vegetables) as well as regular moderate intensity activity for 30 minutes per day 3-5 times per week. 2. Discussed alcohol use could be a contributing factor to increased cholesterol levels  3. Okay to proceed with shoulder surgery   - Recommend continuing Aspirin during the procedure. Will defer to the surgeon if they want to stop Aspirin   - The morning of the procedure take Toprol, Aspirin   - Hold Brilinta one week prior to procedure   4. Goal BP less than 130/80  5. Labs in one week: CMP  6. Follow up as needed    This note was scribed in the presence of Sabrina Valdez MD by Dayton Martínez RN.        Lavonne Radford MD, personally performed the services described in this documentation as scribed by the above signed scribe in my presence, and it is both accurate and complete to the best of our ability and knowledge. I agree with the details independently gathered by my clinical support staff, while the remaining scribed note accurately describes my personal service to the patient. The above RN is working as a scribe for and in the presence of myself .   Working as a scribe, the RN may have prepopulated components of this note with my historical intellectual property under my direct supervision. Any additions to this intellectual property were performed at my direction. Furthermore, the content and accuracy of this note have been reviewed by me to the best of my ability.

## 2022-05-26 ENCOUNTER — OFFICE VISIT (OUTPATIENT)
Dept: CARDIOLOGY CLINIC | Age: 62
End: 2022-05-26
Payer: COMMERCIAL

## 2022-05-26 VITALS
BODY MASS INDEX: 25.84 KG/M2 | WEIGHT: 180.5 LBS | OXYGEN SATURATION: 97 % | HEIGHT: 70 IN | DIASTOLIC BLOOD PRESSURE: 70 MMHG | HEART RATE: 72 BPM | SYSTOLIC BLOOD PRESSURE: 100 MMHG

## 2022-05-26 DIAGNOSIS — Z01.810 PREOP CARDIOVASCULAR EXAM: Primary | ICD-10-CM

## 2022-05-26 DIAGNOSIS — I49.9 IRREGULAR HEART RATE: ICD-10-CM

## 2022-05-26 DIAGNOSIS — I25.5 ISCHEMIC CARDIOMYOPATHY: ICD-10-CM

## 2022-05-26 DIAGNOSIS — I25.10 CORONARY ARTERY DISEASE INVOLVING NATIVE CORONARY ARTERY OF NATIVE HEART WITHOUT ANGINA PECTORIS: ICD-10-CM

## 2022-05-26 DIAGNOSIS — E87.5 HYPERKALEMIA: ICD-10-CM

## 2022-05-26 DIAGNOSIS — E78.5 HYPERLIPIDEMIA, UNSPECIFIED HYPERLIPIDEMIA TYPE: ICD-10-CM

## 2022-05-26 DIAGNOSIS — I10 ESSENTIAL HYPERTENSION: ICD-10-CM

## 2022-05-26 PROCEDURE — 93000 ELECTROCARDIOGRAM COMPLETE: CPT | Performed by: INTERNAL MEDICINE

## 2022-05-26 PROCEDURE — 99214 OFFICE O/P EST MOD 30 MIN: CPT | Performed by: INTERNAL MEDICINE

## 2022-05-26 RX ORDER — MELOXICAM 7.5 MG/1
7.5 TABLET ORAL DAILY
COMMUNITY
Start: 2021-03-31

## 2022-05-26 NOTE — LETTER
[address] [facility] - [address]  Phone: [phone]  Fax: [phone]    Tom Rodriguez MD        May 26, 2022     Patient: Ghislaine Herron   YOB: 1960   Date of Visit: 5/26/2022       To Whom It May Concern:    Ghislaine Herron is okay to proceed with shoulder surgery at low to intermediate cardiac risk. I recommend continuing Aspirin during the procedure. I will leave this up to the surgeon if they want to hold it. You need to hold brilinta one week prior to the procedure. The morning of the procedure take Aspirin and Toprol. If you have any questions or concerns, please don't hesitate to call.     Sincerely,        Tom Rodriguez MD

## 2022-05-31 DIAGNOSIS — I25.5 ISCHEMIC CARDIOMYOPATHY: Primary | ICD-10-CM

## 2022-05-31 DIAGNOSIS — I25.10 CORONARY ARTERY DISEASE INVOLVING NATIVE CORONARY ARTERY OF NATIVE HEART WITHOUT ANGINA PECTORIS: ICD-10-CM

## 2022-05-31 RX ORDER — LOSARTAN POTASSIUM 25 MG/1
25 TABLET ORAL DAILY
Qty: 90 TABLET | Refills: 2 | Status: SHIPPED | OUTPATIENT
Start: 2022-05-31

## 2022-05-31 NOTE — TELEPHONE ENCOUNTER
Pt contacted [facility] stating Mame Telles spoke about lowering MG dose on the Losartan. Pt stated it was suppose to go from 50mg down to 25mg. Please verify & advise.      If the change is correct please send new script to:     [facility] [address] [facility] [address]   Phone:  [phone]  Fax:  [phone]

## 2022-05-31 NOTE — TELEPHONE ENCOUNTER
05/26/22 OV Patient Plan    ~ Potassium has risen to 5.2 although patient is eating much more fruits and vegetable including bananas, potatoes strawberries and blueberries. Given excellent blood pressure control will drop losartan down to 25 mg daily.    ~ Medication pending as discussed in office with Kofi Ochoa.

## 2022-06-01 DIAGNOSIS — I25.10 CORONARY ARTERY DISEASE INVOLVING NATIVE CORONARY ARTERY OF NATIVE HEART WITHOUT ANGINA PECTORIS: ICD-10-CM

## 2022-06-01 NOTE — TELEPHONE ENCOUNTER
Pts wife is requesting refill of   ticagrelor (BRILINTA) 60 MG TABS tablet    [phone][facility]  Last ov 05/26/2022 delphine

## 2022-06-02 ENCOUNTER — HOSPITAL ENCOUNTER (OUTPATIENT)
Age: 62
Discharge: HOME | End: 2022-06-02
Payer: COMMERCIAL

## 2022-06-02 DIAGNOSIS — I25.10 CORONARY ARTERY DISEASE INVOLVING NATIVE CORONARY ARTERY OF NATIVE HEART WITHOUT ANGINA PECTORIS: ICD-10-CM

## 2022-06-02 LAB
A/G RATIO: 2.4 (ref 1.1–2.2)
ALBUMIN SERPL-MCNC: 4.5 G/DL (ref 3.4–5)
ALP BLD-CCNC: 104 U/L (ref 40–129)
ALT SERPL-CCNC: 30 U/L (ref 10–40)
ANION GAP SERPL CALCULATED.3IONS-SCNC: 12 MMOL/L (ref 3–16)
AST SERPL-CCNC: 22 U/L (ref 15–37)
BILIRUB SERPL-MCNC: 0.4 MG/DL (ref 0–1)
BUN BLDV-MCNC: 14 MG/DL (ref 7–20)
CALCIUM SERPL-MCNC: 9.4 MG/DL (ref 8.3–10.6)
CHLORIDE BLD-SCNC: 107 MMOL/L (ref 99–110)
CO2: 22 MMOL/L (ref 21–32)
CREAT SERPL-MCNC: 0.8 MG/DL (ref 0.8–1.3)
GFR AFRICAN AMERICAN: >60
GFR NON-AFRICAN AMERICAN: >60
GLUCOSE BLD-MCNC: 91 MG/DL (ref 70–99)
POTASSIUM SERPL-SCNC: 5.8 MMOL/L (ref 3.5–5.1)
SODIUM BLD-SCNC: 141 MMOL/L (ref 136–145)
TOTAL PROTEIN: 6.4 G/DL (ref 6.4–8.2)

## 2022-06-02 PROCEDURE — 36415 COLL VENOUS BLD VENIPUNCTURE: CPT

## 2022-06-02 PROCEDURE — 80053 COMPREHEN METABOLIC PANEL: CPT

## 2022-06-03 ENCOUNTER — TELEPHONE (OUTPATIENT)
Dept: CARDIOLOGY CLINIC | Age: 62
End: 2022-06-03

## 2022-06-03 DIAGNOSIS — E87.5 HYPERKALEMIA: Primary | ICD-10-CM

## 2022-06-03 NOTE — TELEPHONE ENCOUNTER
----- Message from Andria Burrell MD sent at 6/3/2022 12:11 PM EDT -----  Let patient know their chemistry lab test shows high potassium, patient should cut losartan in half, patient should abstain from eating/drinking foods/drinks that are high in potassium such as orange juice and bananas and should have a follow-up BMP checked for potassium in the next 1-2 days. Lets arrange for all that. Additionally, patient should keep track of blood pressure and heart rate and report back any abnormal numbers. Thanks.

## 2022-06-06 ENCOUNTER — HOSPITAL ENCOUNTER (OUTPATIENT)
Age: 62
Discharge: HOME | End: 2022-06-06
Payer: COMMERCIAL

## 2022-06-06 DIAGNOSIS — E87.5 HYPERKALEMIA: ICD-10-CM

## 2022-06-06 LAB
ANION GAP SERPL CALCULATED.3IONS-SCNC: 15 MMOL/L (ref 3–16)
BUN BLDV-MCNC: 12 MG/DL (ref 7–20)
CALCIUM SERPL-MCNC: 9.4 MG/DL (ref 8.3–10.6)
CHLORIDE BLD-SCNC: 106 MMOL/L (ref 99–110)
CO2: 22 MMOL/L (ref 21–32)
CREAT SERPL-MCNC: 0.9 MG/DL (ref 0.8–1.3)
GFR AFRICAN AMERICAN: >60
GFR NON-AFRICAN AMERICAN: >60
GLUCOSE BLD-MCNC: 98 MG/DL (ref 70–99)
POTASSIUM SERPL-SCNC: 5.3 MMOL/L (ref 3.5–5.1)
SODIUM BLD-SCNC: 143 MMOL/L (ref 136–145)

## 2022-06-06 PROCEDURE — 36415 COLL VENOUS BLD VENIPUNCTURE: CPT

## 2022-06-06 PROCEDURE — 80048 BASIC METABOLIC PNL TOTAL CA: CPT

## 2022-06-07 ENCOUNTER — TELEPHONE (OUTPATIENT)
Dept: CARDIOLOGY CLINIC | Age: 62
End: 2022-06-07

## 2022-06-07 NOTE — PROGRESS NOTES
Harshil Nicole    Age 64 y.o.    male    1960    MRN [id number]    6/15/2022  Arrival Time_____________  OR Time____________165 Min     Procedure(s):  VIDEO ARTHROSCOPY RIGHT SHOULDER, LABRAL REPAIR, POSSIBLE REMPLISSAGE, SUBACROMIAL DECOMPRESSION, ROTATOR CUFF SURGERY AND BICEPS SURGERY AS INDICATED                      General    Surgeon(s):  Sho Holcomb MD       Phone [phone] ([place])     [address] [place] [address]  Cell         Work  _____________________________________________________________________  _____________________________________________________________________  _____________________________________________________________________  _____________________________________________________________________  _____________________________________________________________________    PCP _____________________________ Phone_________________     H&P__________________Bringing      Chart            Epic   DOS      Called________  EKG__________________Bringing      Chart            Epic   DOS      Called________  LAB__________________ Bringing      Chart            Epic   DOS      Called________  Cardiac Clearance_______Bringing      Chart            Epic      DOS      Called________    Cardiologist________________________ Phone___________________________    ? Scientology concerns / Waiver on Chart            [employer]________________  ? Pre-op Instructions Given [place]          _________________________________  ? Directions to Surgery Center                          _________________________________  ? Transportation Home_______________      __________________________________  ?  Crutches/Walker__________________        __________________________________    ________Pre-op Orders   _______Transcribed    _______Wt.  ________Pharmacy          _______SCD  ______VTE     ______TED Avie Sack  _______  Surgery Consent    _______  Anesthesia Consent         COVID DATE______________LOCATION________________ RESULT__________

## 2022-06-07 NOTE — TELEPHONE ENCOUNTER
----- Message from Alma Salas MD sent at 6/6/2022  5:31 PM EDT -----  Please let patient know that the recommendations made by Dr. Luigi Phillips and the reduction in the patient's medication has helped his potassium. I would continue to stressed that he should limit high potassium foods. This includes bananas, avocados, broccoli and spinach (very high) as well as potatoes. He should avoid supplements such as Mrs. Magdalena Anderson

## 2022-06-08 NOTE — PROGRESS NOTES
Obstructive Sleep Apnea (ALEYDA) Screening     Patient:  Wei Moon    YOB: 1960      Medical Record #:  [id number]                     Date:  6/8/2022     1. Are you a loud and/or regular snorer? []  Yes       [x] No    2. Have you been observed to gasp or stop breathing during sleep? []  Yes       [x] No    3. Do you feel tired or groggy upon awakening or do you awaken with a headache?           []  Yes       [] No    4. Are you often tired or fatigued during the wake time hours? []  Yes       [] No    5. Do you fall asleep sitting, reading, watching TV or driving? []  Yes       [] No    6. Do you often have problems with memory or concentration? []  Yes       [] No    **If patient's score is ? 3 they are considered high risk for ALEYDA. An Anesthesia provider will evaluate the patient and develop a plan of care the day of surgery. Note:  If the patient's BMI is more than 35 kg m¯² , has neck circumference > 40 cm, and/or high blood pressure the risk is greater (© American Sleep Apnea Association, 2006).

## 2022-06-08 NOTE — PROGRESS NOTES
Patient instructed to:  Bring Picture ID, insurance card, proof of address  Dress Comfortable  No jewelry  No Makeup  No contacts  Shower evening before or morning of surgery with antibacterial soap. Nothing to eat or drink after midnight the day before surgery. If instructed by MD to take meds day of surgery, take with only a sip of water. If taking am beta blocker, take morning of surgery with sip of water per Dr. Nic Erickson. To continue Aspirin per cardiology. Will stop Brilinta one week prior to surgery per cardiology.

## 2022-06-15 ENCOUNTER — ANESTHESIA EVENT (OUTPATIENT)
Dept: OPERATING ROOM | Age: 62
End: 2022-06-15
Payer: COMMERCIAL

## 2022-06-15 ENCOUNTER — ANESTHESIA (OUTPATIENT)
Dept: OPERATING ROOM | Age: 62
End: 2022-06-15
Payer: COMMERCIAL

## 2022-06-15 ENCOUNTER — HOSPITAL ENCOUNTER (OUTPATIENT)
Age: 62
Setting detail: OUTPATIENT SURGERY
Discharge: HOME | End: 2022-06-15
Attending: ORTHOPAEDIC SURGERY | Admitting: ORTHOPAEDIC SURGERY
Payer: COMMERCIAL

## 2022-06-15 VITALS
TEMPERATURE: 98.6 F | RESPIRATION RATE: 13 BRPM | WEIGHT: 180 LBS | DIASTOLIC BLOOD PRESSURE: 93 MMHG | HEART RATE: 55 BPM | BODY MASS INDEX: 25.77 KG/M2 | OXYGEN SATURATION: 96 % | HEIGHT: 70 IN | SYSTOLIC BLOOD PRESSURE: 149 MMHG

## 2022-06-15 DIAGNOSIS — S43.004D DISLOCATION OF RIGHT SHOULDER JOINT, SUBSEQUENT ENCOUNTER: Primary | ICD-10-CM

## 2022-06-15 LAB — POTASSIUM SERPL-SCNC: 4.3 MMOL/L (ref 3.5–5.1)

## 2022-06-15 PROCEDURE — 64415 NJX AA&/STRD BRCH PLXS IMG: CPT | Performed by: ANESTHESIOLOGY

## 2022-06-15 PROCEDURE — 3700000001 HC ADD 15 MINUTES (ANESTHESIA): Performed by: ORTHOPAEDIC SURGERY

## 2022-06-15 PROCEDURE — 7100000010 HC PHASE II RECOVERY - FIRST 15 MIN: Performed by: ORTHOPAEDIC SURGERY

## 2022-06-15 PROCEDURE — 84132 ASSAY OF SERUM POTASSIUM: CPT

## 2022-06-15 PROCEDURE — 3700000000 HC ANESTHESIA ATTENDED CARE: Performed by: ORTHOPAEDIC SURGERY

## 2022-06-15 PROCEDURE — 6360000002 HC RX W HCPCS: Performed by: NURSE ANESTHETIST, CERTIFIED REGISTERED

## 2022-06-15 PROCEDURE — 2720000010 HC SURG SUPPLY STERILE: Performed by: ORTHOPAEDIC SURGERY

## 2022-06-15 PROCEDURE — 2580000003 HC RX 258: Performed by: ANESTHESIOLOGY

## 2022-06-15 PROCEDURE — C1713 ANCHOR/SCREW BN/BN,TIS/BN: HCPCS | Performed by: ORTHOPAEDIC SURGERY

## 2022-06-15 PROCEDURE — 7100000000 HC PACU RECOVERY - FIRST 15 MIN: Performed by: ORTHOPAEDIC SURGERY

## 2022-06-15 PROCEDURE — 6360000002 HC RX W HCPCS: Performed by: ORTHOPAEDIC SURGERY

## 2022-06-15 PROCEDURE — 2500000003 HC RX 250 WO HCPCS: Performed by: NURSE ANESTHETIST, CERTIFIED REGISTERED

## 2022-06-15 PROCEDURE — 3600000014 HC SURGERY LEVEL 4 ADDTL 15MIN: Performed by: ORTHOPAEDIC SURGERY

## 2022-06-15 PROCEDURE — 7100000001 HC PACU RECOVERY - ADDTL 15 MIN: Performed by: ORTHOPAEDIC SURGERY

## 2022-06-15 PROCEDURE — 2580000003 HC RX 258: Performed by: ORTHOPAEDIC SURGERY

## 2022-06-15 PROCEDURE — 2709999900 HC NON-CHARGEABLE SUPPLY: Performed by: ORTHOPAEDIC SURGERY

## 2022-06-15 PROCEDURE — 6370000000 HC RX 637 (ALT 250 FOR IP): Performed by: ORTHOPAEDIC SURGERY

## 2022-06-15 PROCEDURE — 3600000004 HC SURGERY LEVEL 4 BASE: Performed by: ORTHOPAEDIC SURGERY

## 2022-06-15 PROCEDURE — 7100000011 HC PHASE II RECOVERY - ADDTL 15 MIN: Performed by: ORTHOPAEDIC SURGERY

## 2022-06-15 DEVICE — HEALICOIL RG SA 5.5MM W/2 UB-BL                                    CBRD BL
Type: IMPLANTABLE DEVICE | Site: SHOULDER | Status: FUNCTIONAL
Brand: HEALICOIL / ULTRABRAID

## 2022-06-15 DEVICE — MICRORAPTOR KNOTLESS SA RGNSB
Type: IMPLANTABLE DEVICE | Site: SHOULDER | Status: FUNCTIONAL
Brand: MICRORAPTOR

## 2022-06-15 DEVICE — 1.8MM Q-FIX ALL SUTURE ANCHOR
Type: IMPLANTABLE DEVICE | Site: SHOULDER | Status: FUNCTIONAL
Brand: Q-FIX

## 2022-06-15 RX ORDER — OXYCODONE HYDROCHLORIDE 5 MG/1
5 TABLET ORAL
Status: CANCELLED | OUTPATIENT
Start: 2022-06-15 | End: 2022-06-15

## 2022-06-15 RX ORDER — MIDAZOLAM HYDROCHLORIDE 1 MG/ML
INJECTION INTRAMUSCULAR; INTRAVENOUS PRN
Status: DISCONTINUED | OUTPATIENT
Start: 2022-06-15 | End: 2022-06-15 | Stop reason: SDUPTHER

## 2022-06-15 RX ORDER — SODIUM CHLORIDE 0.9 % (FLUSH) 0.9 %
5-40 SYRINGE (ML) INJECTION EVERY 12 HOURS SCHEDULED
Status: CANCELLED | OUTPATIENT
Start: 2022-06-15

## 2022-06-15 RX ORDER — ACETAMINOPHEN 500 MG
1000 TABLET ORAL ONCE
Status: COMPLETED | OUTPATIENT
Start: 2022-06-15 | End: 2022-06-15

## 2022-06-15 RX ORDER — FENTANYL CITRATE 50 UG/ML
INJECTION, SOLUTION INTRAMUSCULAR; INTRAVENOUS PRN
Status: DISCONTINUED | OUTPATIENT
Start: 2022-06-15 | End: 2022-06-15 | Stop reason: SDUPTHER

## 2022-06-15 RX ORDER — SODIUM CHLORIDE 0.9 % (FLUSH) 0.9 %
5-40 SYRINGE (ML) INJECTION PRN
Status: CANCELLED | OUTPATIENT
Start: 2022-06-15

## 2022-06-15 RX ORDER — SODIUM CHLORIDE, SODIUM LACTATE, POTASSIUM CHLORIDE, CALCIUM CHLORIDE 600; 310; 30; 20 MG/100ML; MG/100ML; MG/100ML; MG/100ML
INJECTION, SOLUTION INTRAVENOUS CONTINUOUS
Status: DISCONTINUED | OUTPATIENT
Start: 2022-06-15 | End: 2022-06-15 | Stop reason: HOSPADM

## 2022-06-15 RX ORDER — LIDOCAINE HYDROCHLORIDE 10 MG/ML
0.3 INJECTION, SOLUTION EPIDURAL; INFILTRATION; INTRACAUDAL; PERINEURAL
Status: DISCONTINUED | OUTPATIENT
Start: 2022-06-15 | End: 2022-06-15 | Stop reason: HOSPADM

## 2022-06-15 RX ORDER — DOCUSATE SODIUM 100 MG/1
100 CAPSULE, LIQUID FILLED ORAL 2 TIMES DAILY PRN
Qty: 20 CAPSULE | Refills: 0 | Status: SHIPPED | OUTPATIENT
Start: 2022-06-15 | End: 2022-07-15

## 2022-06-15 RX ORDER — ROCURONIUM BROMIDE 10 MG/ML
INJECTION, SOLUTION INTRAVENOUS PRN
Status: DISCONTINUED | OUTPATIENT
Start: 2022-06-15 | End: 2022-06-15 | Stop reason: SDUPTHER

## 2022-06-15 RX ORDER — SODIUM CHLORIDE, SODIUM LACTATE, POTASSIUM CHLORIDE, AND CALCIUM CHLORIDE .6; .31; .03; .02 G/100ML; G/100ML; G/100ML; G/100ML
IRRIGANT IRRIGATION PRN
Status: DISCONTINUED | OUTPATIENT
Start: 2022-06-15 | End: 2022-06-15 | Stop reason: ALTCHOICE

## 2022-06-15 RX ORDER — SODIUM CHLORIDE 0.9 % (FLUSH) 0.9 %
5-40 SYRINGE (ML) INJECTION PRN
Status: DISCONTINUED | OUTPATIENT
Start: 2022-06-15 | End: 2022-06-15 | Stop reason: HOSPADM

## 2022-06-15 RX ORDER — ONDANSETRON 2 MG/ML
INJECTION INTRAMUSCULAR; INTRAVENOUS PRN
Status: DISCONTINUED | OUTPATIENT
Start: 2022-06-15 | End: 2022-06-15 | Stop reason: SDUPTHER

## 2022-06-15 RX ORDER — LIDOCAINE HYDROCHLORIDE 10 MG/ML
INJECTION, SOLUTION INFILTRATION; PERINEURAL PRN
Status: DISCONTINUED | OUTPATIENT
Start: 2022-06-15 | End: 2022-06-15 | Stop reason: SDUPTHER

## 2022-06-15 RX ORDER — PROPOFOL 10 MG/ML
INJECTION, EMULSION INTRAVENOUS PRN
Status: DISCONTINUED | OUTPATIENT
Start: 2022-06-15 | End: 2022-06-15 | Stop reason: SDUPTHER

## 2022-06-15 RX ORDER — SODIUM CHLORIDE 9 MG/ML
INJECTION, SOLUTION INTRAVENOUS PRN
Status: DISCONTINUED | OUTPATIENT
Start: 2022-06-15 | End: 2022-06-15 | Stop reason: HOSPADM

## 2022-06-15 RX ORDER — DIPHENHYDRAMINE HYDROCHLORIDE 50 MG/ML
6.25 INJECTION INTRAMUSCULAR; INTRAVENOUS
Status: CANCELLED | OUTPATIENT
Start: 2022-06-15 | End: 2022-06-15

## 2022-06-15 RX ORDER — ONDANSETRON 4 MG/1
4 TABLET, FILM COATED ORAL EVERY 6 HOURS PRN
Qty: 20 TABLET | Refills: 0 | Status: SHIPPED | OUTPATIENT
Start: 2022-06-15

## 2022-06-15 RX ORDER — ONDANSETRON 2 MG/ML
4 INJECTION INTRAMUSCULAR; INTRAVENOUS EVERY 30 MIN PRN
Status: CANCELLED | OUTPATIENT
Start: 2022-06-15

## 2022-06-15 RX ORDER — MIDAZOLAM HYDROCHLORIDE 1 MG/ML
2 INJECTION INTRAMUSCULAR; INTRAVENOUS
Status: CANCELLED | OUTPATIENT
Start: 2022-06-15 | End: 2022-06-15

## 2022-06-15 RX ORDER — OXYCODONE HYDROCHLORIDE AND ACETAMINOPHEN 5; 325 MG/1; MG/1
1 TABLET ORAL EVERY 6 HOURS PRN
Qty: 28 TABLET | Refills: 0 | Status: SHIPPED | OUTPATIENT
Start: 2022-06-15 | End: 2022-06-22

## 2022-06-15 RX ORDER — SODIUM CHLORIDE 9 MG/ML
25 INJECTION, SOLUTION INTRAVENOUS PRN
Status: CANCELLED | OUTPATIENT
Start: 2022-06-15

## 2022-06-15 RX ORDER — MEPERIDINE HYDROCHLORIDE 50 MG/ML
12.5 INJECTION INTRAMUSCULAR; INTRAVENOUS; SUBCUTANEOUS EVERY 5 MIN PRN
Status: CANCELLED | OUTPATIENT
Start: 2022-06-15

## 2022-06-15 RX ORDER — DEXAMETHASONE SODIUM PHOSPHATE 4 MG/ML
INJECTION, SOLUTION INTRA-ARTICULAR; INTRALESIONAL; INTRAMUSCULAR; INTRAVENOUS; SOFT TISSUE PRN
Status: DISCONTINUED | OUTPATIENT
Start: 2022-06-15 | End: 2022-06-15 | Stop reason: SDUPTHER

## 2022-06-15 RX ORDER — SODIUM CHLORIDE 0.9 % (FLUSH) 0.9 %
5-40 SYRINGE (ML) INJECTION EVERY 12 HOURS SCHEDULED
Status: DISCONTINUED | OUTPATIENT
Start: 2022-06-15 | End: 2022-06-15 | Stop reason: HOSPADM

## 2022-06-15 RX ADMIN — FENTANYL CITRATE 50 MCG: 50 INJECTION INTRAMUSCULAR; INTRAVENOUS at 16:27

## 2022-06-15 RX ADMIN — SUGAMMADEX 200 MG: 100 INJECTION, SOLUTION INTRAVENOUS at 17:23

## 2022-06-15 RX ADMIN — ACETAMINOPHEN 1000 MG: 500 TABLET ORAL at 12:57

## 2022-06-15 RX ADMIN — ONDANSETRON 4 MG: 2 INJECTION INTRAMUSCULAR; INTRAVENOUS at 14:15

## 2022-06-15 RX ADMIN — LIDOCAINE HYDROCHLORIDE 50 MG: 10 INJECTION, SOLUTION INFILTRATION; PERINEURAL at 14:04

## 2022-06-15 RX ADMIN — SODIUM CHLORIDE, POTASSIUM CHLORIDE, SODIUM LACTATE AND CALCIUM CHLORIDE: 600; 310; 30; 20 INJECTION, SOLUTION INTRAVENOUS at 15:51

## 2022-06-15 RX ADMIN — DEXAMETHASONE SODIUM PHOSPHATE 10 MG: 4 INJECTION, SOLUTION INTRAMUSCULAR; INTRAVENOUS at 14:14

## 2022-06-15 RX ADMIN — MIDAZOLAM HYDROCHLORIDE 2 MG: 2 INJECTION, SOLUTION INTRAMUSCULAR; INTRAVENOUS at 13:59

## 2022-06-15 RX ADMIN — FENTANYL CITRATE 100 MCG: 50 INJECTION INTRAMUSCULAR; INTRAVENOUS at 14:04

## 2022-06-15 RX ADMIN — SODIUM CHLORIDE, POTASSIUM CHLORIDE, SODIUM LACTATE AND CALCIUM CHLORIDE: 600; 310; 30; 20 INJECTION, SOLUTION INTRAVENOUS at 12:40

## 2022-06-15 RX ADMIN — PROPOFOL 150 MG: 10 INJECTION, EMULSION INTRAVENOUS at 14:04

## 2022-06-15 RX ADMIN — CEFAZOLIN 2000 MG: 10 INJECTION, POWDER, FOR SOLUTION INTRAVENOUS at 14:00

## 2022-06-15 RX ADMIN — ROCURONIUM BROMIDE 50 MG: 10 SOLUTION INTRAVENOUS at 14:04

## 2022-06-15 ASSESSMENT — PAIN DESCRIPTION - DESCRIPTORS: DESCRIPTORS: DISCOMFORT

## 2022-06-15 ASSESSMENT — PAIN DESCRIPTION - LOCATION: LOCATION: SHOULDER

## 2022-06-15 ASSESSMENT — PAIN DESCRIPTION - ORIENTATION: ORIENTATION: RIGHT

## 2022-06-15 ASSESSMENT — PAIN SCALES - GENERAL
PAINLEVEL_OUTOF10: 0
PAINLEVEL_OUTOF10: 2
PAINLEVEL_OUTOF10: 0
PAINLEVEL_OUTOF10: 0

## 2022-06-15 ASSESSMENT — PAIN - FUNCTIONAL ASSESSMENT: PAIN_FUNCTIONAL_ASSESSMENT: 0-10

## 2022-06-15 NOTE — BRIEF OP NOTE
Brief Postoperative Note      Patient: Adria Frost  YOB: 1960  MRN: [id number]    Date of Procedure: 6/15/2022    Pre-Op Diagnosis: RIGHT SHOULDER LABRAL TEAR, INSTABILITY, ROTATOR CUFF STRAIN, IMPINGEMENT    Post-Op Diagnosis: Same       Procedure(s):  1. Right shoulder Bankart lesion/labral repair (62587)  2.   Right shoulder arthroscopy with remplissage of the Hill-Sachs lesion (47239)    Surgeon(s):  Diana Sanchez MD    Assistant:  Surgical Assistant: Linh Chen    Anesthesia: General    Estimated Blood Loss (mL): Minimal    Complications: None    Specimens:   * No specimens in log *    Implants:  * No implants in log *      Drains: * No LDAs found *    Antibiotics: 2 g Ancef IV prior to incision    Findings: Bony Bankart lesion with large Hill-Sachs lesion, fraying of the superior and posterior labrum with mild fraying of the supraspinatus with no full-thickness rotator cuff tear    Electronically signed by Diana Sanchez MD on 6/15/2022 at 12:40 PM

## 2022-06-15 NOTE — ANESTHESIA PROCEDURE NOTES
Peripheral Block    Patient location during procedure: pre-op  Reason for block: post-op pain management and at surgeon's request  Start time: 6/15/2022 1:14 PM  End time: 6/15/2022 1:19 PM  Staffing  Performed: anesthesiologist   Anesthesiologist: Edmar Landaverde MD  Preanesthetic Checklist  Completed: patient identified, IV checked, site marked, risks and benefits discussed, surgical/procedural consents, equipment checked, pre-op evaluation, timeout performed, anesthesia consent given, oxygen available, monitors applied/VS acknowledged, fire risk safety assessment completed and verbalized and blood product R/B/A discussed and consented  Peripheral Block   Patient position: supine  Prep: ChloraPrep  Provider prep: sterile gloves  Patient monitoring: continuous pulse ox, responsive to questions, oxygen and IV access  Block type: Brachial plexus  Interscalene  Laterality: right  Injection technique: single-shot  Guidance: nerve stimulator    Needle   Needle type: insulated echogenic nerve stimulator needle   Needle gauge: 22 G  Needle localization: nerve stimulator  Test dose: negative  Needle length: 5 cm  Assessment   Injection assessment: negative aspiration for heme, no paresthesia on injection and no intravascular symptoms  Paresthesia pain: none  Slow fractionated injection: yes  Hemodynamics: stableno  Outcomes: patient tolerated procedure well    Additional Notes  BPV 0.25% 20cc in divided doses    IVS:  Propofol 60mg

## 2022-06-15 NOTE — OP NOTE
Operative Note      Patient: Antonio Sánchez  YOB: 1960  MRN: [id number]    Date of Procedure: 6/15/2022    Pre-Op Diagnosis: RIGHT SHOULDER LABRAL TEAR, INSTABILITY, ROTATOR CUFF STRAIN, IMPINGEMENT    Post-Op Diagnosis: Same       Procedure(s):  1. Right shoulder Bankart lesion/labral repair (90858)  2. Right shoulder arthroscopy with remplissage of the Hill-Sachs lesion (56852)    Surgeon(s):  Cony Shankar MD    Assistant:  Surgical Assistant: Neva Castrejon    Anesthesia: General    Estimated Blood Loss (mL): Minimal    Complications: None    Specimens:   * No specimens in log *    Implants:  * No implants in log *      Drains: * No LDAs found *    Antibiotics: 2 g Ancef IV prior to incision    Findings: Bony Bankart lesion with large Hill-Sachs lesion, fraying of the superior and posterior labrum with mild fraying of the supraspinatus with no full-thickness rotator cuff tear     Indications: This is a 61-year-old male with a history of right shoulder instability who sustained a shoulder dislocation while in [place] on 5/8/2022 which required reduction in the emergency department with sedation. He has had a total of 5 dislocations. MRI and CT scan did demonstrate a bony Bankart lesion as well as a large Hill-Sachs lesion and some changes to his rotator cuff without definite full-thickness tear. He had significant ongoing subjective instability and evidence of instability on physical exam.     After discussing the pros and cons of treatment options and risks and benefits of surgical intervention, the patient elected to proceed with surgery. They understood that there are no guarantee of results with surgery and there are always risks of infection, stiff joint, injury to nerve or blood vessel, blood clot, anesthesia complications, etc. The procedure and recovery were discussed and all questions answered.       Risks and benefits of the procedure were fully explained in detail, including but not limited to infection, neurovascular injury, continued pain, continued instability, arthritis, stiffness, need for further surgery, re-injury, DVT, PE, general risks of anesthesia and loss of limb or life. He was seen in the preoperative holding area where the right upper extremity was marked with indelible marker. An interscalene block was performed by anesthesia. Please see their notes regarding this procedure. From this point on anesthesia controlled the head, neck and airway throughout the procedure and please see their notes for documentation regarding this. They were transferred to the operating room where Anesthesia was administered and the patient was intubated. He was placed in the lateral decubitus position using a vacuum bean bag with all extremities well-padded and a pillow/pad between the legs and an axillary role beneath the down arm. The right upper extremity was then prepped and draped in a sterile normal orthopedic fashion and a spider lateral arm positioner device was used. 2 g Ancef was administered IV prior to incision. A timeout was performed prior to incision. Arthroscopy  All relevant landmarks were marked with a marker and incision was made in the posterior portal position. The trocar was inserted into the shoulder joint and the joint was encountered. Arthroscopic fluid was turned on and diagnostic arthroscopy commenced. The subscapularis tendon was visualized and was found to be intact. The biceps tendon was visualized and was found to be intact with some fraying at the superior labral attachment without detachment of the anchor. There were some grade 1-2 cartilage changes to the glenoid as well as to the humeral head with evidence of a Bankart lesion and trauma to the anterior inferior and anterior glenoid with synovitis. There was evidence of a large Hill-Sachs lesion.    The rotator cuff is grossly intact with no full-thickness tears and some mild fraying of the undersurface consistent with some tendinitis/tendinopathy, but no tearing and it was determined that rotator cuff repair would not be indicated consistent with his MRI. No loose bodies were found in the axillary pouch. The labrum was torn anteriorly consistent with a Bankart lesion with some fraying of the superior and posterior labrum. Use a spinal needle to localize an anterior superior lateral portal just off the acromion and incision was made. A switching stick was inserted and we inserted a cannula. We then took a spinal needle inserted at the anterior rotator cuff interval tissue just above the subscapularis tendon and incision was made through the skin a cannula was used to create an anterior portal.  A probe was then inserted from probing of the labrum and other structures. The scope was then moved to the anterior superior lateral position for better visualization of the Bankart lesion. We then inserted an elevator to start freeing up the Bankart lesion. There was a bony Bankart lesion with some continuity of the labrum which was also somewhat separate in the anterior inferior aspect of the glenoid. After freeing up the Bankart lesion with the elevator we then used the shaver to gently debride the glenoid bone surface and remove any excess tissue. Arthroscopic shaver was used also also used to debride the labral fraying and tearing of the superior and posterior labrum. It was determined we would try and attempt fixation of the bony Bankart lesion rather than remove the bony fragment in its entirety. We used a passing device to the right to passed through the inferior glenohumeral ligament and anterior inferior labrum and then shuttled a tape through this. The plan was for placement of a knotless [employer] micro Raptor anchor. We then drilled and placed a 1.8 mm Q fix anchor medially on the glenoid at the site of the bony Bankart fragment for bridge repair. After this was drilled we then returned to our previously placed tape. We placed the drill guide for the knotless micro Raptor at the low portion of the glenoid where there was evidence of injury of the labrum. We drilled with good fixation and then tensioned our tape which was previously passed through the capsule and labrum and inserted this with good fixation. We then cut the suture. We then passed both limbs of the Q fix anchor around the Bankart fragment including the bone. The inferior of the 2 was then loaded onto an additional micro Raptor anchor. We then drilled on the edge of the glenoid face at the site of the bony Bankart lesion and inserted this with some tension. We then cut that suture. We then grasped the other suture and loaded onto an anchor. We then drilled and placed a second micro Raptor around the bony Bankart lesion from the Q fix suture. When this was passing it was fragmenting the proximal/superior portion of the bony fragment and so part of the bony fragment had to be removed and the suture was tensioned down and cut. It was determined that 2 additional anchors would be needed to tension the capsule and labrum since there was some loss of the bony fragment which fragmented partially. We then passed once again using our passing device and shuttled a tape through the labrum and capsule in the midportion of the glenoid as well as the proximal one third. These tapes were then loaded with micro Raptor anchors. We then drilled 2 additional  holes and inserted these with good fixation tensioning the capsule and labrum. This did help to reduce the shoulder significantly as well. We then turned our attention back to the remplissage. With the scope in the anterior superior lateral position we visualized the posterior portal and the cannula posteriorly was removed. The all for a double loaded helicoil anchor was inserted.   We found an appropriate position in the [facility] lesion and tapped down somewhat in the midportion of the lesion we then placed our anchor. [employer] was then used to passed through the rotator cuff tendon and capsule and grasp 1 limb of each of the sutures. These were then brought out the shoulder through the tissue. We then tied each limb to its mate cinching down the tendon/capsule at the site of the remplissage and we could visualize this cinching down into our joint arthroscopically. After this was performed the shoulder was stable to anterior translation. He was noted to have significant swelling of his shoulder and chest wall as the right side, however his fingers had normal capillary refill and all his soft tissues were compressible. Closure  Incisions were copiously irrigated and closed with 3-0 nylon and sterile dressing of 4 x 4's, ABDs, and tape was placed. The patient was placed in a sling. They were awakened in the OR and transferred to PACU in stable condition. All sponge and needle counts were correct. Dean Foote MD  [facility]

## 2022-06-15 NOTE — H&P
I saw and examined the patient independently and agree with the H&P dated 6/6/2022 and there are no changes. Dean Foote MD

## 2022-06-16 NOTE — ANESTHESIA POSTPROCEDURE EVALUATION
Department of Anesthesiology  Postprocedure Note    Patient: Inder Vital  MRN: [id number]  YOB: 1960  Date of evaluation: 6/16/2022  Time:  5:31 PM     Procedure Summary     Date: 06/15/22 Room / Location: [address]    Anesthesia Start: 0667 Anesthesia Stop: 6290    Procedure: VIDEO ARTHROSCOPY RIGHT SHOULDER, LABRAL REPAIR AND REMPLISSAGE, (Right Shoulder) Diagnosis:       Superior glenoid labrum lesion of right shoulder, subsequent encounter      Instability of right shoulder joint      Strain of tendon of right rotator cuff, subsequent encounter      Impingement syndrome of right shoulder      (RIGHT SHOULDER LABRAL TEAR, INSTABILITY, ROTATOR CUFF STRAIN, IMPINGEMENT)    Surgeons: Thom Thornton MD Responsible Provider: Asia Cleveland MD    Anesthesia Type: general, regional ASA Status: 3          Anesthesia Type: No value filed. Terry Phase I: Terry Score: 10    Terry Phase II: Terry Score: 10    Last vitals: Reviewed and per EMR flowsheets.        Anesthesia Post Evaluation    Comments: Postoperative Anesthesia Note    Name:    Inder Vital  MRN:      [id number]    Patient Vitals in the past 12 hrs:     LABS:    CBC  Lab Results       Component                Value               Date/Time                  WBC                      8.3                 05/23/2019 09:20 PM        HGB                      13.9                05/23/2019 09:20 PM        HCT                      40.4 (L)            05/23/2019 09:20 PM        PLT                      178                 05/23/2019 09:20 PM   RENAL  Lab Results       Component                Value               Date/Time                  NA                       143                 06/06/2022 12:01 PM        K                        4.3                 06/15/2022 12:35 PM        K                        3.7                 05/23/2019 09:20 PM        CL                       106                 06/06/2022

## 2022-07-14 RX ORDER — ATORVASTATIN CALCIUM 40 MG/1
40 TABLET, FILM COATED ORAL NIGHTLY
Qty: 90 TABLET | Refills: 3 | Status: SHIPPED | OUTPATIENT
Start: 2022-07-14

## 2022-07-14 RX ORDER — ASPIRIN 81 MG/1
81 TABLET, CHEWABLE ORAL DAILY
Qty: 90 TABLET | Refills: 3 | Status: SHIPPED | OUTPATIENT
Start: 2022-07-14

## 2022-07-14 NOTE — TELEPHONE ENCOUNTER
Medication Refill    Medication needing refilled:atorvastatin (LIPITOR) 40 MG tablet    Dosage of the medication: 40 mg     How are you taking this medication (QD, BID, TID, QID, PRN): Take 1 tablet by mouth nightly    30 or 90 day supply called in: 90     When will you run out of your medication: OUT NOW     Which Pharmacy are we sending the medication to?:  [address], [address] [place] [address] [phone]   [place] [address]   Phone:  [phone]  Fax:  [phone]                    Medication Refill    Medication needing refilled: aspirin 81 MG chewable tablet    Dosage of the medication: 81 mg     How are you taking this medication (QD, BID, TID, QID, PRN): Take 1 tablet by mouth daily    30 or 90 day supply called in: 90     When will you run out of your medication: OUT NOW     Which Pharmacy are we sending the medication to?:    [address], [address] [place] [address] [phone] [facility] [phone]   [facility] [address]   Phone:  [phone]  Fax:  [phone]

## 2022-07-21 ENCOUNTER — TELEPHONE (OUTPATIENT)
Dept: CARDIOLOGY CLINIC | Age: 62
End: 2022-07-21

## 2023-07-15 ENCOUNTER — APPOINTMENT (OUTPATIENT)
Dept: CT IMAGING | Age: 63
End: 2023-07-15
Payer: COMMERCIAL

## 2023-07-15 ENCOUNTER — HOSPITAL ENCOUNTER (EMERGENCY)
Age: 63
Discharge: HOME | End: 2023-07-15
Attending: EMERGENCY MEDICINE
Payer: COMMERCIAL

## 2023-07-15 VITALS
OXYGEN SATURATION: 99 % | BODY MASS INDEX: 22.96 KG/M2 | HEART RATE: 79 BPM | TEMPERATURE: 98.3 F | RESPIRATION RATE: 20 BRPM | SYSTOLIC BLOOD PRESSURE: 165 MMHG | DIASTOLIC BLOOD PRESSURE: 111 MMHG | WEIGHT: 160 LBS

## 2023-07-15 DIAGNOSIS — J02.9 ACUTE PHARYNGITIS, UNSPECIFIED ETIOLOGY: Primary | ICD-10-CM

## 2023-07-15 LAB
ANION GAP SERPL CALCULATED.3IONS-SCNC: 11 MMOL/L (ref 3–16)
BASOPHILS # BLD: 0 K/UL (ref 0–0.2)
BASOPHILS NFR BLD: 0.4 %
BUN SERPL-MCNC: 11 MG/DL (ref 7–20)
CALCIUM SERPL-MCNC: 9.7 MG/DL (ref 8.3–10.6)
CHLORIDE SERPL-SCNC: 104 MMOL/L (ref 99–110)
CO2 SERPL-SCNC: 24 MMOL/L (ref 21–32)
CREAT SERPL-MCNC: 0.7 MG/DL (ref 0.8–1.3)
DEPRECATED RDW RBC AUTO: 13 % (ref 12.4–15.4)
EOSINOPHIL # BLD: 0.1 K/UL (ref 0–0.6)
EOSINOPHIL NFR BLD: 1.2 %
GFR SERPLBLD CREATININE-BSD FMLA CKD-EPI: >60 ML/MIN/{1.73_M2}
GLUCOSE SERPL-MCNC: 108 MG/DL (ref 70–99)
HCT VFR BLD AUTO: 41.1 % (ref 40.5–52.5)
HGB BLD-MCNC: 14 G/DL (ref 13.5–17.5)
LYMPHOCYTES # BLD: 1.6 K/UL (ref 1–5.1)
LYMPHOCYTES NFR BLD: 21 %
MCH RBC QN AUTO: 31 PG (ref 26–34)
MCHC RBC AUTO-ENTMCNC: 34.1 G/DL (ref 31–36)
MCV RBC AUTO: 90.7 FL (ref 80–100)
MONOCYTES # BLD: 0.4 K/UL (ref 0–1.3)
MONOCYTES NFR BLD: 5.5 %
NEUTROPHILS # BLD: 5.4 K/UL (ref 1.7–7.7)
NEUTROPHILS NFR BLD: 71.9 %
PLATELET # BLD AUTO: 195 K/UL (ref 135–450)
PMV BLD AUTO: 8.8 FL (ref 5–10.5)
POTASSIUM SERPL-SCNC: 4.5 MMOL/L (ref 3.5–5.1)
RBC # BLD AUTO: 4.53 M/UL (ref 4.2–5.9)
S PYO AG THROAT QL: NEGATIVE
SODIUM SERPL-SCNC: 139 MMOL/L (ref 136–145)
WBC # BLD AUTO: 7.6 K/UL (ref 4–11)

## 2023-07-15 PROCEDURE — 87880 STREP A ASSAY W/OPTIC: CPT

## 2023-07-15 PROCEDURE — 85025 COMPLETE CBC W/AUTO DIFF WBC: CPT

## 2023-07-15 PROCEDURE — 84443 ASSAY THYROID STIM HORMONE: CPT

## 2023-07-15 PROCEDURE — 80048 BASIC METABOLIC PNL TOTAL CA: CPT

## 2023-07-15 PROCEDURE — 6370000000 HC RX 637 (ALT 250 FOR IP): Performed by: EMERGENCY MEDICINE

## 2023-07-15 PROCEDURE — 87081 CULTURE SCREEN ONLY: CPT

## 2023-07-15 PROCEDURE — 6360000004 HC RX CONTRAST MEDICATION: Performed by: EMERGENCY MEDICINE

## 2023-07-15 PROCEDURE — 70491 CT SOFT TISSUE NECK W/DYE: CPT

## 2023-07-15 PROCEDURE — 99285 EMERGENCY DEPT VISIT HI MDM: CPT

## 2023-07-15 RX ADMIN — IOPAMIDOL 75 ML: 755 INJECTION, SOLUTION INTRAVENOUS at 14:32

## 2023-07-15 RX ADMIN — ALUMINUM HYDROXIDE, MAGNESIUM HYDROXIDE, AND SIMETHICONE: 200; 200; 20 SUSPENSION ORAL at 14:36

## 2023-07-15 ASSESSMENT — PAIN SCALES - GENERAL: PAINLEVEL_OUTOF10: 1

## 2023-07-15 ASSESSMENT — PAIN - FUNCTIONAL ASSESSMENT: PAIN_FUNCTIONAL_ASSESSMENT: 0-10

## 2023-07-15 NOTE — DISCHARGE INSTRUCTIONS
The CAT scan did not show any evidence of the infection in the neck. Your blood work otherwise looks normal.  Please follow-up with your primary care doctor. Return for worsening symptoms.

## 2023-07-15 NOTE — ED NOTES
Discharge Medication List as of 7/15/2023  3:24 PM          I reviewed the patient's medical record. FINAL IMPRESSION  1.  Acute pharyngitis, unspecified etiology        Condition at Disposition: Stable    Disposition: Discharge    Brit Mclaughlin [place]  Emergency Medicine       Brit Mclaughlin  07/15/23 1547

## 2023-07-16 LAB
S PYO THROAT QL CULT: NORMAL
TSH SERPL DL<=0.005 MIU/L-ACNC: 1.65 UIU/ML (ref 0.27–4.2)

## 2023-07-17 LAB — S PYO THROAT QL CULT: NORMAL

## (undated) DEVICE — GLOVE SURG SZ 75 L12IN FNGR THK94MIL STD WHT LTX FREE

## (undated) DEVICE — 3M™ TEGADERM™ TRANSPARENT FILM DRESSING FRAME STYLE, 1624W, 2-3/8 IN X 2-3/4 IN (6 CM X 7 CM), 100/CT 4CT/CASE: Brand: 3M™ TEGADERM™

## (undated) DEVICE — CANNULA THREADED FLEX 8.0 X 72MM: Brand: CLEAR-TRAC

## (undated) DEVICE — ULTRABRAID NO.2 WHITE SUTURE AND                                    NEEDLE ASSEMBLY, 38 INCH, 10 PER                                    BOX, STERILE: Brand: ULTRABRAID

## (undated) DEVICE — DRAPE,U/ SHT,SPLIT,PLAS,STERIL: Brand: MEDLINE

## (undated) DEVICE — 3.5 MM FULL RADIUS STRAIGHT                                    BLADES, POWER/EP-1, BEIGE, PACKAGED                                    6 PER BOX, STERILE

## (undated) DEVICE — MICRORAPTOR KNOTLESS DRILL 2.2MM: Brand: MICRORAPTOR

## (undated) DEVICE — SOLUTION IV 1000ML LAC RINGERS PH 6.5 INJ USP VIAFLX PLAS

## (undated) DEVICE — (6) MINITAPE (BLUE) 39.5: Brand: MINITAPE

## (undated) DEVICE — PASSER SUT 45DEG R CRV NIT WIRE LOOP 2 FIBERSTICK MFIL SUT

## (undated) DEVICE — TOWEL,OR,DSP,ST,BLUE,STD,8/PK,10PK/CS: Brand: MEDLINE

## (undated) DEVICE — SOLUTION IRRIG 5L LAC R BG

## (undated) DEVICE — 3M™ STERI-STRIP™ REINFORCED ADHESIVE SKIN CLOSURES, R1547, 1/2 IN X 4 IN (12 MM X 100 MM), 6 STRIPS/ENVELOPE: Brand: 3M™ STERI-STRIP™

## (undated) DEVICE — PACK PROCEDURE SURG ARTHSCP CUST

## (undated) DEVICE — 3M™ IOBAN™ 2 ANTIMICROBIAL INCISE DRAPE 6650EZ: Brand: IOBAN™ 2

## (undated) DEVICE — PAD,ABDOMINAL,8"X10",ST,LF: Brand: MEDLINE

## (undated) DEVICE — DISPOSABLE KIT FOR 1.8 MM Q-FIX                                    IMPLANT, INCLUDES DRILL, DRILL GUIDE                                    AND OBTURATOR: Brand: Q-FIX

## (undated) DEVICE — SET ADMIN PRIMING 7ML L30IN 7.35LB 20 GTT 2ND RLER CLMP

## (undated) DEVICE — 4-PORT MANIFOLD: Brand: NEPTUNE 2

## (undated) DEVICE — SUTURE ETHLN SZ 3-0 L18IN NONABSORBABLE BLK L24MM PS-1 3/8 1663G

## (undated) DEVICE — SET GRAV VENT NVENT CK VLV 3 NDL FREE PRT 10 GTT

## (undated) DEVICE — GOWN,SIRUS,NON REINFRCD,LARGE,SET IN SL: Brand: MEDLINE

## (undated) DEVICE — TUBE IRRIG L8IN LNG PT W/ CONN FOR PMP SYS REDEUCE

## (undated) DEVICE — 3M™ STERI-DRAPE™ U-DRAPE, LONG 1019: Brand: STERI-DRAPE™

## (undated) DEVICE — SYRINGE MED 10ML LUERLOCK TIP W/O SFTY DISP

## (undated) DEVICE — STANDARD HYPODERMIC NEEDLE,POLYPROPYLENE HUB: Brand: MONOJECT

## (undated) DEVICE — FLUID CONTROL SHOULDER DRAPE PACK: Brand: CONVERTORS

## (undated) DEVICE — CATHETER IV 20GA L1.25IN PNK FEP SFTY STR HUB RADPQ DISP

## (undated) DEVICE — SUTURE MCRYL + SZ 4-0 L18IN ABSRB UD L19MM PS-2 3/8 CIR MCP496G

## (undated) DEVICE — TUBING PMP L8FT LNG W/ CONN FOR AR-6400 REDEUCE

## (undated) DEVICE — ACCU-PASS SUTURE SHUTTLE 45                                    DEGREE, RIGHT, STERILE: Brand: ACCU-PASS

## (undated) DEVICE — SOLUTION IV IRRIG 500ML 0.9% SODIUM CHL 2F7123

## (undated) DEVICE — Z DISCONTINUED USE 2275686 GLOVE SURG SZ 8 L12IN FNGR THK13MIL WHT ISOLEX POLYISOPRENE

## (undated) DEVICE — DRAPE,TOWEL,LARGE,INVISISHIELD: Brand: MEDLINE